# Patient Record
Sex: FEMALE | Race: BLACK OR AFRICAN AMERICAN | NOT HISPANIC OR LATINO | Employment: OTHER | URBAN - METROPOLITAN AREA
[De-identification: names, ages, dates, MRNs, and addresses within clinical notes are randomized per-mention and may not be internally consistent; named-entity substitution may affect disease eponyms.]

---

## 2023-02-23 PROBLEM — H05.89: Status: ACTIVE | Noted: 2023-02-23

## 2023-02-23 PROBLEM — B96.89 BACTERIAL VAGINITIS: Status: ACTIVE | Noted: 2023-02-23

## 2023-02-23 PROBLEM — M79.89 BILATERAL SWELLING OF FEET: Status: ACTIVE | Noted: 2023-02-23

## 2023-02-23 PROBLEM — E55.9 VITAMIN D DEFICIENCY: Status: ACTIVE | Noted: 2023-02-23

## 2023-02-23 PROBLEM — N39.0 UTI (URINARY TRACT INFECTION): Status: ACTIVE | Noted: 2023-02-23

## 2023-02-23 PROBLEM — M25.473 ANKLE EDEMA: Status: ACTIVE | Noted: 2023-02-23

## 2023-02-23 PROBLEM — R21 RASH OF FACE: Status: ACTIVE | Noted: 2023-02-23

## 2023-02-23 PROBLEM — B02.23 SHINGLES (HERPES ZOSTER) POLYNEUROPATHY: Status: ACTIVE | Noted: 2023-02-23

## 2023-02-23 PROBLEM — R53.83 FATIGUE: Status: ACTIVE | Noted: 2023-02-23

## 2023-02-23 PROBLEM — R05.9 COUGH: Status: ACTIVE | Noted: 2023-02-23

## 2023-02-23 PROBLEM — N39.0 RECURRENT URINARY TRACT INFECTION: Status: ACTIVE | Noted: 2023-02-23

## 2023-02-23 PROBLEM — N93.9 VAGINAL BLEEDING: Status: ACTIVE | Noted: 2023-02-23

## 2023-02-23 PROBLEM — J18.9 PNEUMONIA: Status: ACTIVE | Noted: 2023-02-23

## 2023-02-23 PROBLEM — R30.0 BURNING WITH URINATION: Status: ACTIVE | Noted: 2023-02-23

## 2023-02-23 PROBLEM — R10.9 ABDOMINAL PAIN: Status: ACTIVE | Noted: 2023-02-23

## 2023-02-23 PROBLEM — R09.89 CHEST CONGESTION: Status: ACTIVE | Noted: 2023-02-23

## 2023-02-23 PROBLEM — R92.8 ABNORMAL MAMMOGRAM: Status: ACTIVE | Noted: 2023-02-23

## 2023-02-23 PROBLEM — Z86.0100 HISTORY OF COLON POLYPS: Status: ACTIVE | Noted: 2023-02-23

## 2023-02-23 PROBLEM — I10 HYPERTENSION: Status: ACTIVE | Noted: 2023-02-23

## 2023-02-23 PROBLEM — R09.81 HEAD CONGESTION: Status: ACTIVE | Noted: 2023-02-23

## 2023-02-23 PROBLEM — R07.9 CHEST PAIN: Status: ACTIVE | Noted: 2023-02-23

## 2023-02-23 PROBLEM — R10.9 FLANK PAIN: Status: ACTIVE | Noted: 2023-02-23

## 2023-02-23 PROBLEM — J45.909 ACUTE ASTHMATIC BRONCHITIS (HHS-HCC): Status: ACTIVE | Noted: 2023-02-23

## 2023-02-23 PROBLEM — R30.0 DYSURIA: Status: ACTIVE | Noted: 2023-02-23

## 2023-02-23 PROBLEM — E78.5 HYPERLIPIDEMIA: Status: ACTIVE | Noted: 2023-02-23

## 2023-02-23 PROBLEM — R31.29 HEMATURIA, MICROSCOPIC: Status: ACTIVE | Noted: 2023-02-23

## 2023-02-23 PROBLEM — R00.2 PALPITATIONS: Status: ACTIVE | Noted: 2023-02-23

## 2023-02-23 PROBLEM — B37.0 ORAL CANDIDIASIS: Status: ACTIVE | Noted: 2023-02-23

## 2023-02-23 PROBLEM — R39.9 URINARY SYMPTOM OR SIGN: Status: ACTIVE | Noted: 2023-02-23

## 2023-02-23 PROBLEM — N76.0 BACTERIAL VAGINITIS: Status: ACTIVE | Noted: 2023-02-23

## 2023-02-23 PROBLEM — L98.9 CHANGING SKIN LESION: Status: ACTIVE | Noted: 2023-02-23

## 2023-02-23 PROBLEM — R73.9 HYPERGLYCEMIA: Status: ACTIVE | Noted: 2023-02-23

## 2023-02-23 PROBLEM — R21 RASH: Status: ACTIVE | Noted: 2023-02-23

## 2023-02-23 PROBLEM — Z86.010 HISTORY OF COLON POLYPS: Status: ACTIVE | Noted: 2023-02-23

## 2023-02-23 RX ORDER — MULTIVIT-MIN/IRON/FOLIC ACID/K 18-600-40
1 CAPSULE ORAL DAILY
COMMUNITY
Start: 2015-08-11

## 2023-02-23 RX ORDER — LATANOPROST 50 UG/ML
1 SOLUTION/ DROPS OPHTHALMIC NIGHTLY
COMMUNITY
Start: 2021-07-23

## 2023-02-23 RX ORDER — AMLODIPINE BESYLATE 5 MG/1
1 TABLET ORAL DAILY
COMMUNITY
Start: 2014-12-15 | End: 2023-03-20 | Stop reason: SDUPTHER

## 2023-02-23 RX ORDER — EZETIMIBE 10 MG/1
1 TABLET ORAL DAILY
COMMUNITY
Start: 2019-07-10 | End: 2023-03-20 | Stop reason: SDUPTHER

## 2023-02-23 RX ORDER — METOPROLOL TARTRATE 50 MG/1
1 TABLET ORAL 2 TIMES DAILY
COMMUNITY
Start: 2014-12-15 | End: 2023-03-20 | Stop reason: SDUPTHER

## 2023-02-23 RX ORDER — ATORVASTATIN CALCIUM 40 MG/1
1 TABLET, FILM COATED ORAL NIGHTLY
COMMUNITY
Start: 2014-02-17 | End: 2023-03-20 | Stop reason: SDUPTHER

## 2023-02-23 RX ORDER — ASPIRIN 81 MG/1
1 TABLET ORAL DAILY
COMMUNITY
Start: 2014-03-11

## 2023-02-23 RX ORDER — VALSARTAN 160 MG/1
1 TABLET ORAL 2 TIMES DAILY
COMMUNITY
Start: 2015-05-14 | End: 2023-03-20 | Stop reason: SDUPTHER

## 2023-02-23 RX ORDER — ALBUTEROL SULFATE 90 UG/1
1-2 AEROSOL, METERED RESPIRATORY (INHALATION) EVERY 6 HOURS PRN
COMMUNITY

## 2023-02-23 RX ORDER — ASPIRIN 325 MG
1 TABLET, DELAYED RELEASE (ENTERIC COATED) ORAL DAILY
COMMUNITY
Start: 2021-01-20 | End: 2024-04-03 | Stop reason: ALTCHOICE

## 2023-03-15 LAB
ALANINE AMINOTRANSFERASE (SGPT) (U/L) IN SER/PLAS: 23 U/L (ref 7–45)
ALBUMIN (G/DL) IN SER/PLAS: 4 G/DL (ref 3.4–5)
ALKALINE PHOSPHATASE (U/L) IN SER/PLAS: 43 U/L (ref 33–136)
ANION GAP IN SER/PLAS: 14 MMOL/L (ref 10–20)
ASPARTATE AMINOTRANSFERASE (SGOT) (U/L) IN SER/PLAS: 20 U/L (ref 9–39)
BASOPHILS (10*3/UL) IN BLOOD BY AUTOMATED COUNT: 0.09 X10E9/L (ref 0–0.1)
BASOPHILS/100 LEUKOCYTES IN BLOOD BY AUTOMATED COUNT: 1 % (ref 0–2)
BILIRUBIN TOTAL (MG/DL) IN SER/PLAS: 1.4 MG/DL (ref 0–1.2)
CALCIUM (MG/DL) IN SER/PLAS: 9.8 MG/DL (ref 8.6–10.6)
CARBON DIOXIDE, TOTAL (MMOL/L) IN SER/PLAS: 30 MMOL/L (ref 21–32)
CHLORIDE (MMOL/L) IN SER/PLAS: 103 MMOL/L (ref 98–107)
CHOLESTEROL (MG/DL) IN SER/PLAS: 151 MG/DL (ref 0–199)
CHOLESTEROL IN HDL (MG/DL) IN SER/PLAS: 52.6 MG/DL
CHOLESTEROL/HDL RATIO: 2.9
CREATININE (MG/DL) IN SER/PLAS: 0.62 MG/DL (ref 0.5–1.05)
EOSINOPHILS (10*3/UL) IN BLOOD BY AUTOMATED COUNT: 0.23 X10E9/L (ref 0–0.4)
EOSINOPHILS/100 LEUKOCYTES IN BLOOD BY AUTOMATED COUNT: 2.6 % (ref 0–6)
ERYTHROCYTE DISTRIBUTION WIDTH (RATIO) BY AUTOMATED COUNT: 15 % (ref 11.5–14.5)
ERYTHROCYTE MEAN CORPUSCULAR HEMOGLOBIN CONCENTRATION (G/DL) BY AUTOMATED: 31.2 G/DL (ref 32–36)
ERYTHROCYTE MEAN CORPUSCULAR VOLUME (FL) BY AUTOMATED COUNT: 85 FL (ref 80–100)
ERYTHROCYTES (10*6/UL) IN BLOOD BY AUTOMATED COUNT: 5.06 X10E12/L (ref 4–5.2)
ESTIMATED AVERAGE GLUCOSE FOR HBA1C: 123 MG/DL
GFR FEMALE: >90 ML/MIN/1.73M2
GLUCOSE (MG/DL) IN SER/PLAS: 93 MG/DL (ref 74–99)
HEMATOCRIT (%) IN BLOOD BY AUTOMATED COUNT: 42.9 % (ref 36–46)
HEMOGLOBIN (G/DL) IN BLOOD: 13.4 G/DL (ref 12–16)
HEMOGLOBIN A1C/HEMOGLOBIN TOTAL IN BLOOD: 5.9 %
IMMATURE GRANULOCYTES/100 LEUKOCYTES IN BLOOD BY AUTOMATED COUNT: 0.2 % (ref 0–0.9)
LDL: 80 MG/DL (ref 0–99)
LEUKOCYTES (10*3/UL) IN BLOOD BY AUTOMATED COUNT: 8.7 X10E9/L (ref 4.4–11.3)
LYMPHOCYTES (10*3/UL) IN BLOOD BY AUTOMATED COUNT: 3.24 X10E9/L (ref 0.8–3)
LYMPHOCYTES/100 LEUKOCYTES IN BLOOD BY AUTOMATED COUNT: 37.2 % (ref 13–44)
MONOCYTES (10*3/UL) IN BLOOD BY AUTOMATED COUNT: 0.69 X10E9/L (ref 0.05–0.8)
MONOCYTES/100 LEUKOCYTES IN BLOOD BY AUTOMATED COUNT: 7.9 % (ref 2–10)
NEUTROPHILS (10*3/UL) IN BLOOD BY AUTOMATED COUNT: 4.44 X10E9/L (ref 1.6–5.5)
NEUTROPHILS/100 LEUKOCYTES IN BLOOD BY AUTOMATED COUNT: 51.1 % (ref 40–80)
NRBC (PER 100 WBCS) BY AUTOMATED COUNT: 0 /100 WBC (ref 0–0)
PLATELETS (10*3/UL) IN BLOOD AUTOMATED COUNT: 394 X10E9/L (ref 150–450)
POTASSIUM (MMOL/L) IN SER/PLAS: 3.6 MMOL/L (ref 3.5–5.3)
PROTEIN TOTAL: 6.6 G/DL (ref 6.4–8.2)
SODIUM (MMOL/L) IN SER/PLAS: 143 MMOL/L (ref 136–145)
THYROTROPIN (MIU/L) IN SER/PLAS BY DETECTION LIMIT <= 0.05 MIU/L: 1.3 MIU/L (ref 0.44–3.98)
TRIGLYCERIDE (MG/DL) IN SER/PLAS: 93 MG/DL (ref 0–149)
UREA NITROGEN (MG/DL) IN SER/PLAS: 9 MG/DL (ref 6–23)
VLDL: 19 MG/DL (ref 0–40)

## 2023-03-19 NOTE — PROGRESS NOTES
Subjective   Patient ID: Alexandra Love is a 77 y.o. female who presents for Follow-up (1 mo fuv).    HPI   She continues on Ezetimibe, Valsartan, Amlodipine, Metoprolol, and Atorvastatin, as they are controlling her symptoms well. She does not voice any side effects. Bp good range.  No cp or sob, she is feeling well    She had cxr repeated from pneumonia.  Infiltrate has cleared.  She is no longer coughing.  She states last week she occasionally brought up some white sputum    She notes an abnormality on the inside of the left thigh. It has been there for several years. She states that it has started to irritate her, it rubs when she walks and gets sore.  She would like to see derm for this and have removed.     Review of Systems   Constitutional: Negative.    Respiratory: Negative.     Cardiovascular: Negative.    Psychiatric/Behavioral: Negative.         Objective   /77   Pulse 61   Temp 36.1 °C (97 °F)   Wt 79.8 kg (176 lb)   BMI 32.19 kg/m²     Physical Exam  Constitutional:       Appearance: Normal appearance.   Cardiovascular:      Rate and Rhythm: Normal rate and regular rhythm.      Pulses: Normal pulses.      Heart sounds: Normal heart sounds.   Pulmonary:      Effort: Pulmonary effort is normal.      Breath sounds: Normal breath sounds.   Abdominal:      General: Bowel sounds are normal.      Palpations: Abdomen is soft.   Musculoskeletal:         General: No tenderness. Normal range of motion.   Skin:     General: Skin is warm and dry.   Neurological:      Mental Status: She is alert and oriented to person, place, and time.   Psychiatric:         Mood and Affect: Mood normal.         Thought Content: Thought content normal.         Judgment: Judgment normal.         Assessment/Plan   Reviewed Labs from 03/15/2023, which  revealed blood count WNL.  Chemistries in good range with FBS of 93. Total Cholesterol 151, HDL 52.6, LDL 80, Triglycerides 93  A1C 5.9  TSH 1.3    Problem List Items Addressed  This Visit    Reviewed Chest X Ray from 03/15/2023 which showed she is back to normal and clear.     Skin Abnormality on the inside of the left thigh.   Provided referral for Dermatology.     Hyperlipidemia  Continue on Atorvastatin 40 mg and Ezetimibe 10 mg. Prescription sent to pharmacy.       Circulatory    Hypertension - Primary  Continue on Amlodipine 5 mg, Metoprolol 50 mg, and Valsartan 160 mg daily. Prescription sent to pharmacy.      Problem List Items Addressed This Visit          Circulatory    Hypertension - Primary    Relevant Orders    Referral to Dermatology       Musculoskeletal    Mass of left thigh    Relevant Medications    amLODIPine (Norvasc) 5 mg tablet    metoprolol tartrate (Lopressor) 50 mg tablet    Other Relevant Orders    Referral to Dermatology       Other    Hyperlipidemia    Relevant Medications    atorvastatin (Lipitor) 40 mg tablet    ezetimibe (Zetia) 10 mg tablet    valsartan (Diovan) 160 mg tablet     Follow up in 6 months, unless a visit is needed prior.      Scribe Attestation  By signing my name below, Lakeshia DUFFY Scribe   attest that this documentation has been prepared under the direction and in the presence of Pratima Albert DO.

## 2023-03-20 ENCOUNTER — OFFICE VISIT (OUTPATIENT)
Dept: PRIMARY CARE | Facility: CLINIC | Age: 78
End: 2023-03-20
Payer: MEDICARE

## 2023-03-20 VITALS
TEMPERATURE: 97 F | WEIGHT: 176 LBS | HEART RATE: 61 BPM | DIASTOLIC BLOOD PRESSURE: 77 MMHG | SYSTOLIC BLOOD PRESSURE: 124 MMHG | BODY MASS INDEX: 32.19 KG/M2

## 2023-03-20 DIAGNOSIS — R22.42 MASS OF LEFT THIGH: ICD-10-CM

## 2023-03-20 DIAGNOSIS — I10 PRIMARY HYPERTENSION: Primary | ICD-10-CM

## 2023-03-20 DIAGNOSIS — E78.2 MIXED HYPERLIPIDEMIA: ICD-10-CM

## 2023-03-20 PROCEDURE — 3078F DIAST BP <80 MM HG: CPT | Performed by: FAMILY MEDICINE

## 2023-03-20 PROCEDURE — 3074F SYST BP LT 130 MM HG: CPT | Performed by: FAMILY MEDICINE

## 2023-03-20 PROCEDURE — 99214 OFFICE O/P EST MOD 30 MIN: CPT | Performed by: FAMILY MEDICINE

## 2023-03-20 PROCEDURE — 1036F TOBACCO NON-USER: CPT | Performed by: FAMILY MEDICINE

## 2023-03-20 RX ORDER — METOPROLOL TARTRATE 50 MG/1
50 TABLET ORAL 2 TIMES DAILY
Qty: 180 TABLET | Refills: 1 | Status: SHIPPED | OUTPATIENT
Start: 2023-03-20 | End: 2023-06-14 | Stop reason: SDUPTHER

## 2023-03-20 RX ORDER — ATORVASTATIN CALCIUM 40 MG/1
40 TABLET, FILM COATED ORAL NIGHTLY
Qty: 90 TABLET | Refills: 1 | Status: SHIPPED | OUTPATIENT
Start: 2023-03-20 | End: 2023-11-29 | Stop reason: SDUPTHER

## 2023-03-20 RX ORDER — AMLODIPINE BESYLATE 5 MG/1
5 TABLET ORAL DAILY
Qty: 90 TABLET | Refills: 1 | Status: SHIPPED | OUTPATIENT
Start: 2023-03-20 | End: 2023-06-14 | Stop reason: SDUPTHER

## 2023-03-20 RX ORDER — EZETIMIBE 10 MG/1
10 TABLET ORAL DAILY
Qty: 90 TABLET | Refills: 1 | Status: SHIPPED | OUTPATIENT
Start: 2023-03-20 | End: 2023-09-29 | Stop reason: SDUPTHER

## 2023-03-20 RX ORDER — VALSARTAN 160 MG/1
160 TABLET ORAL 2 TIMES DAILY
Qty: 180 TABLET | Refills: 1 | Status: SHIPPED | OUTPATIENT
Start: 2023-03-20 | End: 2023-06-14 | Stop reason: SDUPTHER

## 2023-03-20 ASSESSMENT — ENCOUNTER SYMPTOMS
PSYCHIATRIC NEGATIVE: 1
CARDIOVASCULAR NEGATIVE: 1
RESPIRATORY NEGATIVE: 1
CONSTITUTIONAL NEGATIVE: 1

## 2023-03-20 ASSESSMENT — PATIENT HEALTH QUESTIONNAIRE - PHQ9
2. FEELING DOWN, DEPRESSED OR HOPELESS: NOT AT ALL
1. LITTLE INTEREST OR PLEASURE IN DOING THINGS: NOT AT ALL
SUM OF ALL RESPONSES TO PHQ9 QUESTIONS 1 AND 2: 0

## 2023-03-20 NOTE — PATIENT INSTRUCTIONS
Skin Abnormality on the inside of the thigh.   Provided referral for Dermatology.     Hyperlipidemia  Continue on Atorvastatin 40 mg and Ezetimibe 10 mg. Prescription sent to pharmacy.       Circulatory    Hypertension - Primary  Continue on Amlodipine 5 mg, Metoprolol 50 mg, and Valsartan 160 mg daily. Prescription sent to pharmacy.

## 2023-06-14 DIAGNOSIS — E78.2 MIXED HYPERLIPIDEMIA: ICD-10-CM

## 2023-06-14 DIAGNOSIS — R22.42 MASS OF LEFT THIGH: ICD-10-CM

## 2023-06-14 RX ORDER — VALSARTAN 160 MG/1
160 TABLET ORAL 2 TIMES DAILY
Qty: 180 TABLET | Refills: 1 | Status: SHIPPED | OUTPATIENT
Start: 2023-06-14 | End: 2024-01-22 | Stop reason: SDUPTHER

## 2023-06-14 RX ORDER — AMLODIPINE BESYLATE 5 MG/1
5 TABLET ORAL DAILY
Qty: 90 TABLET | Refills: 1 | Status: SHIPPED | OUTPATIENT
Start: 2023-06-14 | End: 2023-11-29 | Stop reason: SDUPTHER

## 2023-06-14 RX ORDER — METOPROLOL TARTRATE 50 MG/1
50 TABLET ORAL 2 TIMES DAILY
Qty: 180 TABLET | Refills: 1 | Status: SHIPPED | OUTPATIENT
Start: 2023-06-14 | End: 2024-01-22 | Stop reason: SDUPTHER

## 2023-07-07 ENCOUNTER — TELEPHONE (OUTPATIENT)
Dept: PRIMARY CARE | Facility: CLINIC | Age: 78
End: 2023-07-07
Payer: MEDICARE

## 2023-07-07 NOTE — TELEPHONE ENCOUNTER
Pt st both of her feet are very swollen and they hurt. Is there anything you can recommend she do?

## 2023-09-05 NOTE — PROGRESS NOTES
Subjective   Patient ID: Alexandra Love is a 77 y.o. female who presents for Med Management.    The patient is not compliant with medications. Patient denies any side effects to the medications.  Has not started spironolactone started by Dr Cantu    HPI pt was hydrochlorothiazide, discontinued and started spironolactone 25 mg twice a day.  She has not started but stopped hydrochlor   ankle swelling during the day but not on any diuretic.  Goes down over night.  Had discussed amlodipine may cause also, she is still taking.  Would like her to try spironolactone as prescribed by cardiology and stay on ARB and amlodipine as well.  She is agreeable    Bp is mildly elevated today, she has taken her meds except for spironolactone    She is tolerating statin, taking zetia as well    She is due for labs.  She is fasting today and will have them drawn this morning    Review of Systems      Patient Care Team:  Pratima Albert DO as PCP - General  Pratima Albert DO as PCP - [facility] Attributed Provider  Pratima Albert DO       Objective   /88   Temp 36.4 °C (97.5 °F)   Wt 80.7 kg (178 lb)   SpO2 93%   BMI 32.56 kg/m²     Physical Exam  Constitutional:       Appearance: Normal appearance.   Neck:      Vascular: No carotid bruit.   Cardiovascular:      Rate and Rhythm: Normal rate and regular rhythm.      Pulses: Normal pulses.      Heart sounds: Normal heart sounds.   Pulmonary:      Effort: Pulmonary effort is normal.      Breath sounds: Normal breath sounds.   Musculoskeletal:         General: Normal range of motion.      Cervical back: Normal range of motion.      Right lower leg: No edema.      Left lower leg: No edema.   Lymphadenopathy:      Cervical: No cervical adenopathy.   Skin:     General: Skin is warm and dry.   Neurological:      Mental Status: She is alert and oriented to person, place, and time.   Psychiatric:         Mood and Affect: Mood normal.         Thought  Content: Thought content normal.         Judgment: Judgment normal.             Assessment/Plan   Problem List Items Addressed This Visit       Ankle edema - Primary    Relevant Orders    TSH with reflex to Free T4 if abnormal    Hyperglycemia    Relevant Orders    Comprehensive Metabolic Panel    Hemoglobin A1C    TSH with reflex to Free T4 if abnormal    Hyperlipidemia    Relevant Orders    Comprehensive Metabolic Panel    Lipid Panel    Hypertension    Relevant Orders    CBC and Auto Differential    TSH with reflex to Free T4 if abnormal    Vitamin D deficiency     Other Visit Diagnoses       Breast cancer screening by mammogram        Relevant Orders    BI mammo bilateral screening tomosynthesis        Fasting labs, pt will follow up in 2 weeks to review and will recheck her bp at that time    She will start spironolactone as prescribed by cardiology and stay off hydrochlorothiazide .  Will take all other meds as prescribed    Ordered mammogram, pt plans on doing it this fall, she is doing q o year    She will call sooner if any problem          Scribe Attestation  By signing my name below, [employer]imple , Scribe   attest that this documentation has been prepared under the direction and in the presence of Pratima Albert DO.

## 2023-09-07 ENCOUNTER — OFFICE VISIT (OUTPATIENT)
Dept: PRIMARY CARE | Facility: CLINIC | Age: 78
End: 2023-09-07
Payer: MEDICARE

## 2023-09-07 ENCOUNTER — LAB (OUTPATIENT)
Dept: LAB | Facility: LAB | Age: 78
End: 2023-09-07
Payer: MEDICARE

## 2023-09-07 VITALS
TEMPERATURE: 97.5 F | SYSTOLIC BLOOD PRESSURE: 150 MMHG | DIASTOLIC BLOOD PRESSURE: 88 MMHG | BODY MASS INDEX: 32.56 KG/M2 | WEIGHT: 178 LBS | OXYGEN SATURATION: 93 %

## 2023-09-07 DIAGNOSIS — I10 PRIMARY HYPERTENSION: ICD-10-CM

## 2023-09-07 DIAGNOSIS — M25.473 ANKLE EDEMA: Primary | ICD-10-CM

## 2023-09-07 DIAGNOSIS — E55.9 VITAMIN D DEFICIENCY: ICD-10-CM

## 2023-09-07 DIAGNOSIS — R73.9 HYPERGLYCEMIA: ICD-10-CM

## 2023-09-07 DIAGNOSIS — M25.473 ANKLE EDEMA: ICD-10-CM

## 2023-09-07 DIAGNOSIS — E78.5 HYPERLIPIDEMIA, UNSPECIFIED HYPERLIPIDEMIA TYPE: ICD-10-CM

## 2023-09-07 DIAGNOSIS — Z12.31 BREAST CANCER SCREENING BY MAMMOGRAM: ICD-10-CM

## 2023-09-07 PROBLEM — D48.5 NEOPLASM OF UNCERTAIN BEHAVIOR OF SKIN: Status: ACTIVE | Noted: 2023-04-19

## 2023-09-07 PROBLEM — L82.1 OTHER SEBORRHEIC KERATOSIS: Status: ACTIVE | Noted: 2023-04-19

## 2023-09-07 PROBLEM — J45.909 ACUTE ASTHMATIC BRONCHITIS (HHS-HCC): Status: ACTIVE | Noted: 2023-09-07

## 2023-09-07 PROBLEM — R60.9 EDEMA: Status: ACTIVE | Noted: 2023-09-07

## 2023-09-07 LAB
ALANINE AMINOTRANSFERASE (SGPT) (U/L) IN SER/PLAS: 18 U/L (ref 7–45)
ALBUMIN (G/DL) IN SER/PLAS: 4.5 G/DL (ref 3.4–5)
ALKALINE PHOSPHATASE (U/L) IN SER/PLAS: 50 U/L (ref 33–136)
ANION GAP IN SER/PLAS: 12 MMOL/L (ref 10–20)
ASPARTATE AMINOTRANSFERASE (SGOT) (U/L) IN SER/PLAS: 18 U/L (ref 9–39)
BASOPHILS (10*3/UL) IN BLOOD BY AUTOMATED COUNT: 0.05 X10E9/L (ref 0–0.1)
BASOPHILS/100 LEUKOCYTES IN BLOOD BY AUTOMATED COUNT: 0.7 % (ref 0–2)
BILIRUBIN TOTAL (MG/DL) IN SER/PLAS: 1.9 MG/DL (ref 0–1.2)
CALCIUM (MG/DL) IN SER/PLAS: 10 MG/DL (ref 8.6–10.6)
CARBON DIOXIDE, TOTAL (MMOL/L) IN SER/PLAS: 30 MMOL/L (ref 21–32)
CHLORIDE (MMOL/L) IN SER/PLAS: 105 MMOL/L (ref 98–107)
CHOLESTEROL (MG/DL) IN SER/PLAS: 142 MG/DL (ref 0–199)
CHOLESTEROL IN HDL (MG/DL) IN SER/PLAS: 55.9 MG/DL
CHOLESTEROL/HDL RATIO: 2.5
CREATININE (MG/DL) IN SER/PLAS: 0.7 MG/DL (ref 0.5–1.05)
EOSINOPHILS (10*3/UL) IN BLOOD BY AUTOMATED COUNT: 0.3 X10E9/L (ref 0–0.4)
EOSINOPHILS/100 LEUKOCYTES IN BLOOD BY AUTOMATED COUNT: 4.1 % (ref 0–6)
ERYTHROCYTE DISTRIBUTION WIDTH (RATIO) BY AUTOMATED COUNT: 14.8 % (ref 11.5–14.5)
ERYTHROCYTE MEAN CORPUSCULAR HEMOGLOBIN CONCENTRATION (G/DL) BY AUTOMATED: 30.7 G/DL (ref 32–36)
ERYTHROCYTE MEAN CORPUSCULAR VOLUME (FL) BY AUTOMATED COUNT: 85 FL (ref 80–100)
ERYTHROCYTES (10*6/UL) IN BLOOD BY AUTOMATED COUNT: 5.29 X10E12/L (ref 4–5.2)
ESTIMATED AVERAGE GLUCOSE FOR HBA1C: 120 MG/DL
GFR FEMALE: 90 ML/MIN/1.73M2
GLUCOSE (MG/DL) IN SER/PLAS: 89 MG/DL (ref 74–99)
HEMATOCRIT (%) IN BLOOD BY AUTOMATED COUNT: 44.9 % (ref 36–46)
HEMOGLOBIN (G/DL) IN BLOOD: 13.8 G/DL (ref 12–16)
HEMOGLOBIN A1C/HEMOGLOBIN TOTAL IN BLOOD: 5.8 %
IMMATURE GRANULOCYTES/100 LEUKOCYTES IN BLOOD BY AUTOMATED COUNT: 0.3 % (ref 0–0.9)
LDL: 70 MG/DL (ref 0–99)
LEUKOCYTES (10*3/UL) IN BLOOD BY AUTOMATED COUNT: 7.4 X10E9/L (ref 4.4–11.3)
LYMPHOCYTES (10*3/UL) IN BLOOD BY AUTOMATED COUNT: 2.52 X10E9/L (ref 0.8–3)
LYMPHOCYTES/100 LEUKOCYTES IN BLOOD BY AUTOMATED COUNT: 34.1 % (ref 13–44)
MONOCYTES (10*3/UL) IN BLOOD BY AUTOMATED COUNT: 0.62 X10E9/L (ref 0.05–0.8)
MONOCYTES/100 LEUKOCYTES IN BLOOD BY AUTOMATED COUNT: 8.4 % (ref 2–10)
NEUTROPHILS (10*3/UL) IN BLOOD BY AUTOMATED COUNT: 3.87 X10E9/L (ref 1.6–5.5)
NEUTROPHILS/100 LEUKOCYTES IN BLOOD BY AUTOMATED COUNT: 52.4 % (ref 40–80)
NRBC (PER 100 WBCS) BY AUTOMATED COUNT: 0 /100 WBC (ref 0–0)
PLATELETS (10*3/UL) IN BLOOD AUTOMATED COUNT: 317 X10E9/L (ref 150–450)
POTASSIUM (MMOL/L) IN SER/PLAS: 4 MMOL/L (ref 3.5–5.3)
PROTEIN TOTAL: 7.8 G/DL (ref 6.4–8.2)
SODIUM (MMOL/L) IN SER/PLAS: 143 MMOL/L (ref 136–145)
THYROTROPIN (MIU/L) IN SER/PLAS BY DETECTION LIMIT <= 0.05 MIU/L: 1.27 MIU/L (ref 0.44–3.98)
TRIGLYCERIDE (MG/DL) IN SER/PLAS: 81 MG/DL (ref 0–149)
UREA NITROGEN (MG/DL) IN SER/PLAS: 13 MG/DL (ref 6–23)
VLDL: 16 MG/DL (ref 0–40)

## 2023-09-07 PROCEDURE — 36415 COLL VENOUS BLD VENIPUNCTURE: CPT

## 2023-09-07 PROCEDURE — 99214 OFFICE O/P EST MOD 30 MIN: CPT | Performed by: FAMILY MEDICINE

## 2023-09-07 PROCEDURE — 80053 COMPREHEN METABOLIC PANEL: CPT

## 2023-09-07 PROCEDURE — 1159F MED LIST DOCD IN RCRD: CPT | Performed by: FAMILY MEDICINE

## 2023-09-07 PROCEDURE — 80061 LIPID PANEL: CPT

## 2023-09-07 PROCEDURE — 1126F AMNT PAIN NOTED NONE PRSNT: CPT | Performed by: FAMILY MEDICINE

## 2023-09-07 PROCEDURE — 83036 HEMOGLOBIN GLYCOSYLATED A1C: CPT

## 2023-09-07 PROCEDURE — 3077F SYST BP >= 140 MM HG: CPT | Performed by: FAMILY MEDICINE

## 2023-09-07 PROCEDURE — 84443 ASSAY THYROID STIM HORMONE: CPT

## 2023-09-07 PROCEDURE — 85025 COMPLETE CBC W/AUTO DIFF WBC: CPT

## 2023-09-07 PROCEDURE — 1036F TOBACCO NON-USER: CPT | Performed by: FAMILY MEDICINE

## 2023-09-07 PROCEDURE — 3079F DIAST BP 80-89 MM HG: CPT | Performed by: FAMILY MEDICINE

## 2023-09-07 RX ORDER — SPIRONOLACTONE 25 MG/1
1 TABLET ORAL 2 TIMES DAILY
COMMUNITY
Start: 2023-08-29 | End: 2023-09-29 | Stop reason: ALTCHOICE

## 2023-09-07 RX ORDER — HYDROCHLOROTHIAZIDE 25 MG/1
1 TABLET ORAL DAILY
COMMUNITY
Start: 2023-07-19 | End: 2023-09-07 | Stop reason: ALTCHOICE

## 2023-09-07 ASSESSMENT — ENCOUNTER SYMPTOMS
DEPRESSION: 0
OCCASIONAL FEELINGS OF UNSTEADINESS: 0
LOSS OF SENSATION IN FEET: 0

## 2023-09-25 ENCOUNTER — APPOINTMENT (OUTPATIENT)
Dept: PRIMARY CARE | Facility: CLINIC | Age: 78
End: 2023-09-25
Payer: MEDICARE

## 2023-09-29 ENCOUNTER — OFFICE VISIT (OUTPATIENT)
Dept: PRIMARY CARE | Facility: CLINIC | Age: 78
End: 2023-09-29
Payer: MEDICARE

## 2023-09-29 VITALS
BODY MASS INDEX: 32.56 KG/M2 | SYSTOLIC BLOOD PRESSURE: 136 MMHG | OXYGEN SATURATION: 97 % | WEIGHT: 178 LBS | DIASTOLIC BLOOD PRESSURE: 86 MMHG | TEMPERATURE: 97.5 F

## 2023-09-29 DIAGNOSIS — Z12.11 SCREENING FOR COLON CANCER: ICD-10-CM

## 2023-09-29 DIAGNOSIS — Z00.00 MEDICARE ANNUAL WELLNESS VISIT, SUBSEQUENT: Primary | ICD-10-CM

## 2023-09-29 DIAGNOSIS — M25.473 ANKLE EDEMA: ICD-10-CM

## 2023-09-29 DIAGNOSIS — I10 PRIMARY HYPERTENSION: ICD-10-CM

## 2023-09-29 DIAGNOSIS — Z23 NEED FOR INFLUENZA VACCINATION: ICD-10-CM

## 2023-09-29 DIAGNOSIS — Z23 NEED FOR PNEUMOCOCCAL VACCINATION: ICD-10-CM

## 2023-09-29 DIAGNOSIS — R73.9 HYPERGLYCEMIA: ICD-10-CM

## 2023-09-29 DIAGNOSIS — E78.2 MIXED HYPERLIPIDEMIA: ICD-10-CM

## 2023-09-29 PROBLEM — Z86.69 H/O BLEPHARITIS: Status: ACTIVE | Noted: 2023-06-30

## 2023-09-29 PROBLEM — H35.89 RETINAL NERVE FIBER BUNDLE DEFECTS: Status: ACTIVE | Noted: 2023-06-30

## 2023-09-29 PROBLEM — H04.123 DRY EYE SYNDROME OF BOTH EYES: Status: ACTIVE | Noted: 2023-06-30

## 2023-09-29 PROBLEM — H40.1131 PRIMARY OPEN ANGLE GLAUCOMA (POAG) OF BOTH EYES, MILD STAGE: Status: ACTIVE | Noted: 2023-06-30

## 2023-09-29 PROBLEM — H25.813 COMBINED FORMS OF AGE-RELATED CATARACT OF BOTH EYES: Status: ACTIVE | Noted: 2023-06-30

## 2023-09-29 PROCEDURE — G0008 ADMIN INFLUENZA VIRUS VAC: HCPCS | Performed by: FAMILY MEDICINE

## 2023-09-29 PROCEDURE — 1036F TOBACCO NON-USER: CPT | Performed by: FAMILY MEDICINE

## 2023-09-29 PROCEDURE — 3079F DIAST BP 80-89 MM HG: CPT | Performed by: FAMILY MEDICINE

## 2023-09-29 PROCEDURE — 1159F MED LIST DOCD IN RCRD: CPT | Performed by: FAMILY MEDICINE

## 2023-09-29 PROCEDURE — 99214 OFFICE O/P EST MOD 30 MIN: CPT | Performed by: FAMILY MEDICINE

## 2023-09-29 PROCEDURE — G0009 ADMIN PNEUMOCOCCAL VACCINE: HCPCS | Performed by: FAMILY MEDICINE

## 2023-09-29 PROCEDURE — 1126F AMNT PAIN NOTED NONE PRSNT: CPT | Performed by: FAMILY MEDICINE

## 2023-09-29 PROCEDURE — 90677 PCV20 VACCINE IM: CPT | Performed by: FAMILY MEDICINE

## 2023-09-29 PROCEDURE — 1170F FXNL STATUS ASSESSED: CPT | Performed by: FAMILY MEDICINE

## 2023-09-29 PROCEDURE — 1160F RVW MEDS BY RX/DR IN RCRD: CPT | Performed by: FAMILY MEDICINE

## 2023-09-29 PROCEDURE — 3075F SYST BP GE 130 - 139MM HG: CPT | Performed by: FAMILY MEDICINE

## 2023-09-29 PROCEDURE — G0439 PPPS, SUBSEQ VISIT: HCPCS | Performed by: FAMILY MEDICINE

## 2023-09-29 PROCEDURE — 90662 IIV NO PRSV INCREASED AG IM: CPT | Performed by: FAMILY MEDICINE

## 2023-09-29 RX ORDER — HYDROCHLOROTHIAZIDE 25 MG/1
25 TABLET ORAL DAILY
Qty: 90 TABLET | Refills: 1
Start: 2023-09-29 | End: 2024-01-22 | Stop reason: SDUPTHER

## 2023-09-29 RX ORDER — EZETIMIBE 10 MG/1
10 TABLET ORAL DAILY
Qty: 90 TABLET | Refills: 1 | Status: SHIPPED | OUTPATIENT
Start: 2023-09-29 | End: 2024-01-22 | Stop reason: SDUPTHER

## 2023-09-29 ASSESSMENT — ACTIVITIES OF DAILY LIVING (ADL)
DRESSING: INDEPENDENT
BATHING: INDEPENDENT
JUDGMENT_ADEQUATE_SAFELY_COMPLETE_DAILY_ACTIVITIES: YES
PATIENT'S MEMORY ADEQUATE TO SAFELY COMPLETE DAILY ACTIVITIES?: YES
MANAGING_FINANCES: INDEPENDENT
TOILETING: INDEPENDENT
DRESSING YOURSELF: INDEPENDENT
DOING_HOUSEWORK: INDEPENDENT
GROOMING: INDEPENDENT
GROCERY_SHOPPING: INDEPENDENT
FEEDING YOURSELF: INDEPENDENT
ADEQUATE_TO_COMPLETE_ADL: YES
TAKING_MEDICATION: INDEPENDENT

## 2023-09-29 ASSESSMENT — LIFESTYLE VARIABLES
HOW MANY STANDARD DRINKS CONTAINING ALCOHOL DO YOU HAVE ON A TYPICAL DAY: PATIENT DOES NOT DRINK
HOW OFTEN DO YOU HAVE SIX OR MORE DRINKS ON ONE OCCASION: NEVER
SKIP TO QUESTIONS 9-10: 1
HOW OFTEN DO YOU HAVE A DRINK CONTAINING ALCOHOL: NEVER
AUDIT-C TOTAL SCORE: 0

## 2023-09-29 ASSESSMENT — PATIENT HEALTH QUESTIONNAIRE - PHQ9
1. LITTLE INTEREST OR PLEASURE IN DOING THINGS: NOT AT ALL
2. FEELING DOWN, DEPRESSED OR HOPELESS: NOT AT ALL
SUM OF ALL RESPONSES TO PHQ9 QUESTIONS 1 AND 2: 0

## 2023-09-29 ASSESSMENT — COLUMBIA-SUICIDE SEVERITY RATING SCALE - C-SSRS
2. HAVE YOU ACTUALLY HAD ANY THOUGHTS OF KILLING YOURSELF?: NO
6. HAVE YOU EVER DONE ANYTHING, STARTED TO DO ANYTHING, OR PREPARED TO DO ANYTHING TO END YOUR LIFE?: NO
1. IN THE PAST MONTH, HAVE YOU WISHED YOU WERE DEAD OR WISHED YOU COULD GO TO SLEEP AND NOT WAKE UP?: NO

## 2023-09-29 ASSESSMENT — ENCOUNTER SYMPTOMS
DEPRESSION: 0
OCCASIONAL FEELINGS OF UNSTEADINESS: 0
LOSS OF SENSATION IN FEET: 0

## 2023-09-29 NOTE — PROGRESS NOTES
Subjective   Reason for Visit: Alexandra Love is an 77 y.o. female here for a Medicare Wellness visit.     Past Medical, Surgical, and Family History reviewed and updated in chart.    Reviewed all medications by prescribing practitioner or clinical pharmacist (such as prescriptions, OTCs, herbal therapies and supplements) and documented in the medical record.    HPI  pt states bp better at home.  Dr Cantu took pt off spironolactone and back on hydrochlorothiazide 25 mg.  No longer having swelling of ankles and feels well, no sob    No cp or sob.  Feeling well.  Breathing better since changed back diuretic      Pt hgba1c mildly improved, controlling w diet and exercise.  Making good effort    Lipid profile controlled w zetia and lipitor.    Had eye exam 3/2023, scheduled for oct for follow up for glaucoma    Patient Care Team:  Pratima Albert DO as PCP - General  Pratima Albert DO as PCP - [facility] Attributed Provider  Pratima Albert DO     Review of Systems    Objective   Vitals:  /86   Temp 36.4 °C (97.5 °F)   Wt 80.7 kg (178 lb)   SpO2 97%   BMI 32.56 kg/m²       Physical Exam  Neck:      Vascular: No carotid bruit.   Cardiovascular:      Rate and Rhythm: Normal rate and regular rhythm.      Pulses: Normal pulses.      Heart sounds: Normal heart sounds.   Pulmonary:      Effort: Pulmonary effort is normal.      Breath sounds: Normal breath sounds.   Abdominal:      General: Bowel sounds are normal.      Palpations: Abdomen is soft. There is no mass.      Tenderness: There is no abdominal tenderness.   Musculoskeletal:         General: No tenderness. Normal range of motion.      Cervical back: Normal range of motion.      Right lower leg: No edema.      Left lower leg: No edema.   Skin:     General: Skin is warm and dry.   Neurological:      Mental Status: She is alert and oriented to person, place, and time.   Psychiatric:         Mood and Affect: Mood normal.          Thought Content: Thought content normal.         Judgment: Judgment normal.         Assessment/Plan   Problem List Items Addressed This Visit       Ankle edema    Hyperglycemia    Hyperlipidemia    Relevant Medications    ezetimibe (Zetia) 10 mg tablet    Hypertension    Relevant Medications    hydroCHLOROthiazide (HYDRODiuril) 25 mg tablet     Other Visit Diagnoses       Medicare annual wellness visit, subsequent    -  Primary    Need for influenza vaccination        Relevant Orders    Flu vaccine, quadrivalent, high-dose, preservative free, age 65y+ (FLUZONE)    Need for pneumococcal vaccination        Relevant Orders    Pneumococcal conjugate vaccine, 20-valent, adult (PREVNAR 20)    Screening for colon cancer        Relevant Orders    Colonoscopy Screening        Due for follow up colonoscopy, ordered today  Refilled meds, no changes  Flu and prevnar 20 vaccines today  Discussed covid and rsv vaccines today  Reviewed labs in detail  Has mamm order from me, told can not schedule until after Nov, although last mamm on chart is from 2021?? Pt will double check this w [facility] scheduling    Needs dexa, wants to wait    6 month follow up or prn sooner

## 2023-10-20 PROBLEM — H52.03 HYPEROPIA OF BOTH EYES: Status: ACTIVE | Noted: 2023-06-30

## 2023-11-03 DIAGNOSIS — D12.6 TUBULAR ADENOMA OF COLON: Primary | ICD-10-CM

## 2023-11-03 DIAGNOSIS — Z80.0 FAMILY HISTORY OF COLON CANCER: ICD-10-CM

## 2023-11-03 RX ORDER — POLYETHYLENE GLYCOL 3350, SODIUM CHLORIDE, SODIUM BICARBONATE, POTASSIUM CHLORIDE 420; 11.2; 5.72; 1.48 G/4L; G/4L; G/4L; G/4L
4000 POWDER, FOR SOLUTION ORAL ONCE
Qty: 4000 ML | Refills: 0 | Status: SHIPPED | OUTPATIENT
Start: 2023-11-03 | End: 2023-11-03

## 2023-11-29 DIAGNOSIS — E78.2 MIXED HYPERLIPIDEMIA: ICD-10-CM

## 2023-11-29 DIAGNOSIS — R22.42 MASS OF LEFT THIGH: ICD-10-CM

## 2023-11-29 NOTE — TELEPHONE ENCOUNTER
Pt states she needs refills on amlodipine 5mg and atorvastatin 40mg. Last ov 10/23/23. Next ov 01/05/2024. [facility]. Thank you

## 2023-11-30 RX ORDER — ATORVASTATIN CALCIUM 40 MG/1
40 TABLET, FILM COATED ORAL NIGHTLY
Qty: 30 TABLET | Refills: 1 | Status: SHIPPED | OUTPATIENT
Start: 2023-11-30 | End: 2024-01-05 | Stop reason: SDUPTHER

## 2023-11-30 RX ORDER — AMLODIPINE BESYLATE 5 MG/1
5 TABLET ORAL DAILY
Qty: 30 TABLET | Refills: 1 | Status: SHIPPED | OUTPATIENT
Start: 2023-11-30 | End: 2024-01-05 | Stop reason: SDUPTHER

## 2023-12-06 ENCOUNTER — ANCILLARY PROCEDURE (OUTPATIENT)
Dept: RADIOLOGY | Facility: CLINIC | Age: 78
End: 2023-12-06
Payer: MEDICARE

## 2023-12-06 DIAGNOSIS — Z12.31 BREAST CANCER SCREENING BY MAMMOGRAM: ICD-10-CM

## 2023-12-06 PROCEDURE — 77063 BREAST TOMOSYNTHESIS BI: CPT | Mod: BILATERAL PROCEDURE | Performed by: RADIOLOGY

## 2023-12-06 PROCEDURE — 77063 BREAST TOMOSYNTHESIS BI: CPT

## 2023-12-06 PROCEDURE — 77067 SCR MAMMO BI INCL CAD: CPT | Mod: BILATERAL PROCEDURE | Performed by: RADIOLOGY

## 2023-12-12 NOTE — TELEPHONE ENCOUNTER
----- Message from Pratima Albert DO sent at 12/11/2023  5:15 PM EST -----  Report to pt her mamm is wnl and repeat one year

## 2024-01-05 ENCOUNTER — APPOINTMENT (OUTPATIENT)
Dept: PRIMARY CARE | Facility: CLINIC | Age: 79
End: 2024-01-05
Payer: MEDICARE

## 2024-01-05 DIAGNOSIS — E78.2 MIXED HYPERLIPIDEMIA: ICD-10-CM

## 2024-01-05 DIAGNOSIS — R22.42 MASS OF LEFT THIGH: ICD-10-CM

## 2024-01-05 RX ORDER — ATORVASTATIN CALCIUM 40 MG/1
40 TABLET, FILM COATED ORAL NIGHTLY
Qty: 30 TABLET | Refills: 0 | Status: SHIPPED | OUTPATIENT
Start: 2024-01-05 | End: 2024-01-22 | Stop reason: SDUPTHER

## 2024-01-05 RX ORDER — AMLODIPINE BESYLATE 5 MG/1
5 TABLET ORAL DAILY
Qty: 30 TABLET | Refills: 0 | Status: SHIPPED | OUTPATIENT
Start: 2024-01-05 | End: 2024-01-22 | Stop reason: SDUPTHER

## 2024-01-22 ENCOUNTER — OFFICE VISIT (OUTPATIENT)
Dept: PRIMARY CARE | Facility: CLINIC | Age: 79
End: 2024-01-22
Payer: MEDICARE

## 2024-01-22 VITALS
BODY MASS INDEX: 32.19 KG/M2 | HEART RATE: 64 BPM | DIASTOLIC BLOOD PRESSURE: 82 MMHG | WEIGHT: 176 LBS | OXYGEN SATURATION: 94 % | SYSTOLIC BLOOD PRESSURE: 130 MMHG

## 2024-01-22 DIAGNOSIS — E78.2 MIXED HYPERLIPIDEMIA: ICD-10-CM

## 2024-01-22 DIAGNOSIS — R22.42 MASS OF LEFT THIGH: ICD-10-CM

## 2024-01-22 DIAGNOSIS — I10 PRIMARY HYPERTENSION: ICD-10-CM

## 2024-01-22 DIAGNOSIS — R73.9 HYPERGLYCEMIA: Primary | ICD-10-CM

## 2024-01-22 PROCEDURE — 1159F MED LIST DOCD IN RCRD: CPT | Performed by: FAMILY MEDICINE

## 2024-01-22 PROCEDURE — 3079F DIAST BP 80-89 MM HG: CPT | Performed by: FAMILY MEDICINE

## 2024-01-22 PROCEDURE — 1126F AMNT PAIN NOTED NONE PRSNT: CPT | Performed by: FAMILY MEDICINE

## 2024-01-22 PROCEDURE — 1160F RVW MEDS BY RX/DR IN RCRD: CPT | Performed by: FAMILY MEDICINE

## 2024-01-22 PROCEDURE — 1036F TOBACCO NON-USER: CPT | Performed by: FAMILY MEDICINE

## 2024-01-22 PROCEDURE — 3075F SYST BP GE 130 - 139MM HG: CPT | Performed by: FAMILY MEDICINE

## 2024-01-22 PROCEDURE — 99214 OFFICE O/P EST MOD 30 MIN: CPT | Performed by: FAMILY MEDICINE

## 2024-01-22 RX ORDER — ATORVASTATIN CALCIUM 40 MG/1
40 TABLET, FILM COATED ORAL NIGHTLY
Qty: 90 TABLET | Refills: 1 | Status: SHIPPED | OUTPATIENT
Start: 2024-01-22 | End: 2024-04-04 | Stop reason: HOSPADM

## 2024-01-22 RX ORDER — HYDROCHLOROTHIAZIDE 25 MG/1
25 TABLET ORAL DAILY
Qty: 90 TABLET | Refills: 1 | Status: SHIPPED | OUTPATIENT
Start: 2024-01-22 | End: 2024-03-19 | Stop reason: ALTCHOICE

## 2024-01-22 RX ORDER — VALSARTAN 160 MG/1
160 TABLET ORAL 2 TIMES DAILY
Qty: 180 TABLET | Refills: 1 | Status: SHIPPED | OUTPATIENT
Start: 2024-01-22 | End: 2024-03-07 | Stop reason: SDUPTHER

## 2024-01-22 RX ORDER — AMLODIPINE BESYLATE 5 MG/1
5 TABLET ORAL DAILY
Qty: 90 TABLET | Refills: 1 | Status: ON HOLD | OUTPATIENT
Start: 2024-01-22 | End: 2024-04-04 | Stop reason: SDUPTHER

## 2024-01-22 RX ORDER — EZETIMIBE 10 MG/1
10 TABLET ORAL DAILY
Qty: 90 TABLET | Refills: 1 | Status: SHIPPED | OUTPATIENT
Start: 2024-01-22 | End: 2024-07-20

## 2024-01-22 RX ORDER — AMLODIPINE BESYLATE 5 MG/1
5 TABLET ORAL DAILY
Qty: 30 TABLET | Refills: 0 | Status: SHIPPED | OUTPATIENT
Start: 2024-01-22 | End: 2024-01-22 | Stop reason: SDUPTHER

## 2024-01-22 RX ORDER — METOPROLOL TARTRATE 50 MG/1
50 TABLET ORAL 2 TIMES DAILY
Qty: 180 TABLET | Refills: 1 | Status: SHIPPED | OUTPATIENT
Start: 2024-01-22 | End: 2024-03-07 | Stop reason: SDUPTHER

## 2024-01-22 RX ORDER — MUPIROCIN 20 MG/G
OINTMENT TOPICAL
COMMUNITY
Start: 2023-12-19 | End: 2024-04-03 | Stop reason: ALTCHOICE

## 2024-01-22 RX ORDER — ATORVASTATIN CALCIUM 40 MG/1
40 TABLET, FILM COATED ORAL NIGHTLY
Qty: 30 TABLET | Refills: 0 | Status: SHIPPED | OUTPATIENT
Start: 2024-01-22 | End: 2024-01-22 | Stop reason: SDUPTHER

## 2024-01-22 ASSESSMENT — PATIENT HEALTH QUESTIONNAIRE - PHQ9
2. FEELING DOWN, DEPRESSED OR HOPELESS: NOT AT ALL
SUM OF ALL RESPONSES TO PHQ9 QUESTIONS 1 AND 2: 0
1. LITTLE INTEREST OR PLEASURE IN DOING THINGS: NOT AT ALL

## 2024-01-22 ASSESSMENT — ENCOUNTER SYMPTOMS
LOSS OF SENSATION IN FEET: 0
DEPRESSION: 0
OCCASIONAL FEELINGS OF UNSTEADINESS: 0

## 2024-01-22 NOTE — PROGRESS NOTES
Subjective   Patient ID: Alexandra Love is a 78 y.o. female who presents for Follow-up (MED FOLLOW UP).    HPI   The patient presents to the clinic for medication follow-up. She has past medical history of hypertension, hyperlipidemia, heart palpitations, and bilateral swelling of feet.    She has been compliant with medication and denies any side-effects to current medication.    Her blood pressure (130/82) was elevated when checked in the clinic today. She does not monitor her blood pressure at home. She continues on valsartan, metoprolol tartrate, amlodipine, and hydrochlorothiazide medications to control her hypertension. She thinks that her blood pressure may have been elevated as she is slightly anxious secondary to her scheduled colonoscopy screening tomorrow (01/23/2024).    The patient reports that she had a mammogram screening on 12/06/2023 and requests for her results to be reviewed.  It was normal    Tolerating statin and Zetia, needs labs in March    Still on glaucoma eye drops, sees eye dr regularly    Had questions about colon prep and discussed today, has regimen for miralx and dulcolox and gatorade etc    Review of Systems    Objective   /82   Pulse 64   Wt 79.8 kg (176 lb)   SpO2 94%   BMI 32.19 kg/m²     Physical Exam  Constitutional:       Appearance: Normal appearance.   Neck:      Vascular: No carotid bruit.   Cardiovascular:      Rate and Rhythm: Normal rate and regular rhythm.      Pulses: Normal pulses.      Heart sounds: Normal heart sounds.   Pulmonary:      Effort: Pulmonary effort is normal.      Breath sounds: Normal breath sounds.   Musculoskeletal:         General: Normal range of motion.      Cervical back: Normal range of motion.      Right lower leg: No edema.      Left lower leg: No edema.   Skin:     General: Skin is warm and dry.   Neurological:      Mental Status: She is alert and oriented to person, place, and time.   Psychiatric:         Mood and Affect: Mood normal.          Thought Content: Thought content normal.         Judgment: Judgment normal.         Assessment/Plan   Problem List Items Addressed This Visit             ICD-10-CM    Hyperglycemia - Primary R73.9    Hyperlipidemia E78.5    Relevant Medications    valsartan (Diovan) 160 mg tablet    ezetimibe (Zetia) 10 mg tablet    atorvastatin (Lipitor) 40 mg tablet    Other Relevant Orders    Comprehensive Metabolic Panel    Lipid Panel    Hypertension I10    Relevant Medications    hydroCHLOROthiazide (HYDRODiuril) 25 mg tablet    Other Relevant Orders    Comprehensive Metabolic Panel    Mass of left thigh R22.42    Relevant Medications    metoprolol tartrate (Lopressor) 50 mg tablet    amLODIPine (Norvasc) 5 mg tablet          Labs (CMP, lipid panel) were ordered for the patient to complete before her next visit.    The patient's mammogram results (12/06/2023) were briefly reviewed. She was informed that her results were normal and she was recommended to have a repeat screening in 1 year.    Preparation for her scheduled colonoscopy screening (01/23/2024) was discussed with the patient.     The patient received refills for current medication (atorvastatin 40 mg, ezetimibe 10 mg, hydrochlorothiazide 25 mg, metoprolol tartrate 50 mg, valsartan 160 mg). Continue taking medication as previously directed.    In regards to her hypertension, the patient was advised to purchase a blood pressure cuff that fits her upper arm and to begin monitoring blood pressure at home. She was advised to continue taking valsartan, metoprolol tartrate, amlodipine, and hydrochlorothiazide medications as previously directed. Continue monitoring symptoms for improvement/exacerbation.    She will follow-up in 2-3 months, unless otherwise needed.  Will review labs and recheck bp.  She will bring in cuff and readings    Scribe Attestation  By signing my name below, I, Jose Juan Guajardo , Scribe   attest that this documentation has been prepared under the  direction and in the presence of Pratima Albert DO.

## 2024-01-23 ENCOUNTER — HOSPITAL ENCOUNTER (OUTPATIENT)
Dept: GASTROENTEROLOGY | Facility: HOSPITAL | Age: 79
Setting detail: OUTPATIENT SURGERY
Discharge: HOME | End: 2024-01-23
Payer: MEDICARE

## 2024-01-23 VITALS
DIASTOLIC BLOOD PRESSURE: 71 MMHG | BODY MASS INDEX: 32.66 KG/M2 | RESPIRATION RATE: 12 BRPM | HEIGHT: 62 IN | TEMPERATURE: 96.8 F | SYSTOLIC BLOOD PRESSURE: 109 MMHG | WEIGHT: 177.47 LBS | HEART RATE: 59 BPM | OXYGEN SATURATION: 95 %

## 2024-01-23 DIAGNOSIS — K63.5 POLYP OF COLON, UNSPECIFIED PART OF COLON, UNSPECIFIED TYPE: ICD-10-CM

## 2024-01-23 DIAGNOSIS — Z12.11 SCREENING FOR COLON CANCER: Primary | ICD-10-CM

## 2024-01-23 PROCEDURE — G0500 MOD SEDAT ENDO SERVICE >5YRS: HCPCS | Performed by: INTERNAL MEDICINE

## 2024-01-23 PROCEDURE — G0121 COLON CA SCRN NOT HI RSK IND: HCPCS | Performed by: INTERNAL MEDICINE

## 2024-01-23 PROCEDURE — 7100000010 HC PHASE TWO TIME - EACH INCREMENTAL 1 MINUTE

## 2024-01-23 PROCEDURE — 3700000013 HC SEDATION LEVEL 5+ TIME - EACH ADDITIONAL 15 MINUTES

## 2024-01-23 PROCEDURE — 2500000004 HC RX 250 GENERAL PHARMACY W/ HCPCS (ALT 636 FOR OP/ED): Performed by: INTERNAL MEDICINE

## 2024-01-23 PROCEDURE — 45378 DIAGNOSTIC COLONOSCOPY: CPT | Performed by: INTERNAL MEDICINE

## 2024-01-23 PROCEDURE — 7100000009 HC PHASE TWO TIME - INITIAL BASE CHARGE

## 2024-01-23 PROCEDURE — 99153 MOD SED SAME PHYS/QHP EA: CPT | Performed by: INTERNAL MEDICINE

## 2024-01-23 PROCEDURE — 99153 MOD SED SAME PHYS/QHP EA: CPT

## 2024-01-23 PROCEDURE — 3700000012 HC SEDATION LEVEL 5+ TIME - INITIAL 15 MINUTES 5/> YEARS

## 2024-01-23 RX ORDER — MEPERIDINE HYDROCHLORIDE 25 MG/ML
INJECTION INTRAMUSCULAR; INTRAVENOUS; SUBCUTANEOUS AS NEEDED
Status: COMPLETED | OUTPATIENT
Start: 2024-01-23 | End: 2024-01-23

## 2024-01-23 RX ORDER — MIDAZOLAM HYDROCHLORIDE 1 MG/ML
INJECTION, SOLUTION INTRAMUSCULAR; INTRAVENOUS AS NEEDED
Status: COMPLETED | OUTPATIENT
Start: 2024-01-23 | End: 2024-01-23

## 2024-01-23 RX ADMIN — MIDAZOLAM HYDROCHLORIDE 2 MG: 1 INJECTION INTRAMUSCULAR; INTRAVENOUS at 10:11

## 2024-01-23 RX ADMIN — MEPERIDINE HYDROCHLORIDE 50 MG: 25 INJECTION INTRAMUSCULAR; INTRAVENOUS; SUBCUTANEOUS at 10:11

## 2024-01-23 ASSESSMENT — PAIN SCALES - GENERAL
PAINLEVEL_OUTOF10: 0 - NO PAIN

## 2024-01-23 ASSESSMENT — PAIN - FUNCTIONAL ASSESSMENT: PAIN_FUNCTIONAL_ASSESSMENT: 0-10

## 2024-01-23 ASSESSMENT — COLUMBIA-SUICIDE SEVERITY RATING SCALE - C-SSRS
1. IN THE PAST MONTH, HAVE YOU WISHED YOU WERE DEAD OR WISHED YOU COULD GO TO SLEEP AND NOT WAKE UP?: NO
6. HAVE YOU EVER DONE ANYTHING, STARTED TO DO ANYTHING, OR PREPARED TO DO ANYTHING TO END YOUR LIFE?: NO
2. HAVE YOU ACTUALLY HAD ANY THOUGHTS OF KILLING YOURSELF?: NO

## 2024-01-23 NOTE — H&P
History Of Present Illness  Alexandra Love is a 78 y.o. female presenting with personal history of reported adenomatous polyp(s) and family history of colon cancer for open-access surveillance colonoscopy.     Past Medical History  Past Medical History:   Diagnosis Date    Colon polyp     Delayed emergence from general anesthesia     Dysuria 2016    Burning with urination    Encounter for examination of blood pressure without abnormal findings 2015    Blood pressure check    Encounter for examination of blood pressure without abnormal findings 2015    Blood pressure check    Hyperlipidemia     Hypertension     Other conditions influencing health status 2015    Follow-up arranged    Personal history of colonic polyps 2018    History of colon polyps    Personal history of other diseases of the circulatory system 2018    History of hypertension    Personal history of other diseases of the circulatory system 2016    History of hypertension    Rash and other nonspecific skin eruption 2022    Rash    Unspecified abdominal pain 2018    Abdominal pain     Surgical History  Past Surgical History:   Procedure Laterality Date     SECTION, LOW TRANSVERSE      TOTAL ABDOMINAL HYSTERECTOMY W/ BILATERAL SALPINGOOPHORECTOMY  2014    Total Abdominal Hysterectomy With Removal Of Both Ovaries     Social History  She reports that she has never smoked. She has never used smokeless tobacco. She reports that she does not currently use alcohol. She reports that she does not currently use drugs.    Family History  Family History   Problem Relation Name Age of Onset    Ovarian cancer Mother      Cancer Father          gastric cancer    Colon cancer Sister      Diabetes Other      Breast cancer Neg Hx          Allergies  Allergies   Allergen Reactions    Other Unknown     Loop diuretics      Sulfa (Sulfonamide Antibiotics) Unknown    Sulfamethoxazole Other    Sulfonylureas  "Unknown     Sulfones      Thiazides Unknown     Type diuretics       Pre-sedation Evaluation:  ASA Classification - ASA 3 - Patient with moderate systemic disease with functional limitations  Mallampati Score - II (hard and soft palate, upper portion of tonsils anduvula visible)    Physical Exam  Constitutional:       Appearance: Normal appearance.   HENT:      Mouth/Throat:      Mouth: Mucous membranes are moist.   Eyes:      Conjunctiva/sclera: Conjunctivae normal.   Cardiovascular:      Rate and Rhythm: Normal rate.   Pulmonary:      Effort: Pulmonary effort is normal.   Abdominal:      Palpations: Abdomen is soft.   Skin:     General: Skin is warm.   Neurological:      Mental Status: She is oriented to person, place, and time.   Psychiatric:         Mood and Affect: Mood normal.          Last Recorded Vitals  Blood pressure 162/90, pulse 82, temperature 36.3 °C (97.3 °F), temperature source Temporal, resp. rate 16, height 1.575 m (5' 2\"), weight 80.5 kg (177 lb 7.5 oz), SpO2 96 %.     Assessment/Plan   personal history of reported adenomatous polyp(s) and family history of colon cancer for open-access surveillance colonoscopy.     PTA/Current Medications:  (Not in a hospital admission)    Current Outpatient Medications   Medication Sig Dispense Refill    amLODIPine (Norvasc) 5 mg tablet Take 1 tablet (5 mg) by mouth once daily. 90 tablet 1    ascorbic acid, vitamin C, 500 mg capsule Take 1 capsule by mouth once daily.      aspirin 81 mg EC tablet Take 1 tablet (81 mg) by mouth once daily.      atorvastatin (Lipitor) 40 mg tablet Take 1 tablet (40 mg) by mouth once daily at bedtime. 90 tablet 1    ergocalciferol, vitamin D2, (CALCIFEROL ORAL) Vitamin D 1000 units caps---take as directed      ezetimibe (Zetia) 10 mg tablet Take 1 tablet (10 mg) by mouth once daily. 90 tablet 1    hydroCHLOROthiazide (HYDRODiuril) 25 mg tablet Take 1 tablet (25 mg) by mouth once daily. 90 tablet 1    latanoprost (Xalatan) 0.005 % " ophthalmic solution Administer 1 drop into both eyes once daily at bedtime. As directed      metoprolol tartrate (Lopressor) 50 mg tablet Take 1 tablet by mouth 2 times a day. 180 tablet 1    valsartan (Diovan) 160 mg tablet Take 1 tablet (160 mg) by mouth 2 times a day. 180 tablet 1    albuterol 90 mcg/actuation inhaler Inhale 1-2 puffs every 6 hours if needed.      co-enzyme Q-10 50 mg capsule Take 1 capsule (50 mg) by mouth once daily.      mupirocin (Bactroban) 2 % ointment APPLY TO 3-4X DAILY TO AFFECTED AREA       No current facility-administered medications for this encounter.     Danis Teague MD

## 2024-01-23 NOTE — DISCHARGE INSTRUCTIONS
Patient Instructions after a Colonoscopy      The anesthetics, sedatives or narcotics which were given to you today will be acting in your body for the next 24 hours, so you might feel a little sleepy or groggy.  This feeling should slowly wear off. Carefully read and follow the instructions.     You received sedation today:  - Do not drive or operate any machinery or power tools of any kind.   - No alcoholic beverages today, not even beer or wine.  - Do not make any important decisions or sign any legal documents.  - No over the counter medications that contain alcohol or that may cause drowsiness.  - Do not make any important decisions or sign any legal documents.    While it is common to experience mild to moderate abdominal distention, gas, or belching after your procedure, if any of these symptoms occur following discharge from the GI Lab or within one week of having your procedure, call the [facility] to be advised whether a visit to your nearest Urgent Care or Emergency Department is indicated.  Take this paper with you if you go.     - If you develop an allergic reaction to the medications that were given during your procedure such as difficulty breathing, rash, hives, severe nausea, vomiting or lightheadedness.  - If you experience chest pain, shortness of breath, severe abdominal pain, fevers and chills.  -If you develop signs and symptoms of bleeding such as blood in your spit, if your stools turn black, tarry, or bloody  - If you have not urinated within 8 hours following your procedure.  - If your IV site becomes painful, red, inflamed, or looks infected.    If you received a biopsy/polypectomy/sphincterotomy the following instructions apply below:    __ Do not use Aspirin containing products, non-steroidal medications or anti-coagulants for one week following your procedure. (Examples of these types of medications are: Advil, Arthrotec, Aleve, Coumadin, Ecotrin, Heparin, Ibuprofen,  Indocin, Motrin, Naprosyn, Nuprin, Plavix, Vioxx, and Voltarin, or their generic forms.  This list is not all-inclusive.  Check with your physician or pharmacist before resuming medications.)   __ Eat a soft diet today.  Avoid foods that are poorly digested for the next 24 hours.  These foods would include: nuts, beans, lettuce, red meats, and fried foods. Start with liquids and advance your diet as tolerated, gradually work up to eating solids.   __ Do not have a Barium Study or Enema for one week.    Your physician recommends the additional following instructions:    -You have a contact number available for emergencies. The signs and symptoms of potential delayed complications were discussed with you. You may return to normal activities tomorrow.  -Resume your previous diet.  -Continue your present medications.   -We are waiting for your pathology results.  -Your physician has recommended a repeat colonoscopy (date to be determined after pending pathology results are reviewed) for surveillance based on pathology results.  -The findings and recommendations have been discussed with you.  -The findings and recommendations were discussed with your family.  - Please see Medication Reconciliation Form for new medication/medications prescribed.       If you experience any problems or have any questions following discharge from the GI Lab, please call:        Nurse Signature                                                                        Date___________________                                                                            Patient/Responsible Party Signature                                        Date___________________

## 2024-03-07 DIAGNOSIS — E78.2 MIXED HYPERLIPIDEMIA: ICD-10-CM

## 2024-03-07 DIAGNOSIS — R22.42 MASS OF LEFT THIGH: ICD-10-CM

## 2024-03-07 RX ORDER — METOPROLOL TARTRATE 50 MG/1
50 TABLET ORAL 2 TIMES DAILY
Qty: 180 TABLET | Refills: 1 | Status: SHIPPED | OUTPATIENT
Start: 2024-03-07 | End: 2024-04-04 | Stop reason: HOSPADM

## 2024-03-07 RX ORDER — VALSARTAN 160 MG/1
160 TABLET ORAL 2 TIMES DAILY
Qty: 180 TABLET | Refills: 1 | Status: SHIPPED | OUTPATIENT
Start: 2024-03-07 | End: 2024-09-03

## 2024-03-14 ENCOUNTER — APPOINTMENT (OUTPATIENT)
Dept: PRIMARY CARE | Facility: CLINIC | Age: 79
End: 2024-03-14
Payer: MEDICARE

## 2024-03-19 ENCOUNTER — OFFICE VISIT (OUTPATIENT)
Dept: CARDIOLOGY | Facility: HOSPITAL | Age: 79
End: 2024-03-19
Payer: MEDICARE

## 2024-03-19 VITALS
WEIGHT: 177 LBS | DIASTOLIC BLOOD PRESSURE: 84 MMHG | SYSTOLIC BLOOD PRESSURE: 151 MMHG | HEART RATE: 69 BPM | HEIGHT: 61 IN | OXYGEN SATURATION: 98 % | BODY MASS INDEX: 33.42 KG/M2

## 2024-03-19 DIAGNOSIS — I10 PRIMARY HYPERTENSION: ICD-10-CM

## 2024-03-19 DIAGNOSIS — R00.2 PALPITATIONS: Primary | ICD-10-CM

## 2024-03-19 LAB
ATRIAL RATE: 63 BPM
P AXIS: 56 DEGREES
P OFFSET: 181 MS
P ONSET: 117 MS
PR INTERVAL: 214 MS
Q ONSET: 224 MS
QRS COUNT: 10 BEATS
QRS DURATION: 84 MS
QT INTERVAL: 438 MS
QTC CALCULATION(BAZETT): 448 MS
QTC FREDERICIA: 445 MS
R AXIS: 13 DEGREES
T AXIS: 54 DEGREES
T OFFSET: 443 MS
VENTRICULAR RATE: 63 BPM

## 2024-03-19 PROCEDURE — 93010 ELECTROCARDIOGRAM REPORT: CPT | Performed by: INTERNAL MEDICINE

## 2024-03-19 PROCEDURE — 1123F ACP DISCUSS/DSCN MKR DOCD: CPT | Performed by: NURSE PRACTITIONER

## 2024-03-19 PROCEDURE — 99214 OFFICE O/P EST MOD 30 MIN: CPT | Performed by: NURSE PRACTITIONER

## 2024-03-19 PROCEDURE — 3079F DIAST BP 80-89 MM HG: CPT | Performed by: NURSE PRACTITIONER

## 2024-03-19 PROCEDURE — 1159F MED LIST DOCD IN RCRD: CPT | Performed by: NURSE PRACTITIONER

## 2024-03-19 PROCEDURE — 3077F SYST BP >= 140 MM HG: CPT | Performed by: NURSE PRACTITIONER

## 2024-03-19 PROCEDURE — 99214 OFFICE O/P EST MOD 30 MIN: CPT | Mod: 25 | Performed by: NURSE PRACTITIONER

## 2024-03-19 PROCEDURE — 1036F TOBACCO NON-USER: CPT | Performed by: NURSE PRACTITIONER

## 2024-03-19 PROCEDURE — 93005 ELECTROCARDIOGRAM TRACING: CPT | Performed by: NURSE PRACTITIONER

## 2024-03-19 PROCEDURE — 1160F RVW MEDS BY RX/DR IN RCRD: CPT | Performed by: NURSE PRACTITIONER

## 2024-03-19 ASSESSMENT — ENCOUNTER SYMPTOMS: HYPERTENSION: 1

## 2024-03-19 NOTE — PROGRESS NOTES
Subjective   Alexandra Love is a 78 y.o. female.    Chief Complaint:  Hyperlipidemia and Hypertension    Mrs. Love returns for an interval follow up. She has a history of palpitations, and had a much longer episode over the weekend. Her palpitations have subsided. She can not identify any clear triggers. She has remained compliant with her medications. She now believes it was likely related to stress. She offers no other cardiovascular complaints or concerns today. She denies any complaints of chest pain, shortness of breath, lightheadedness, dizziness, palpitations, syncope, orthopnea, paroxysmal nocturnal dyspnea, lower extremity swelling or bleeding concerns.      Hyperlipidemia    Hypertension        Review of Systems   All other systems reviewed and are negative.      Objective   Physical Exam  Constitutional:       Appearance: Healthy appearance. In no distress  Pulmonary:      Effort: Pulmonary effort is normal.      Breath sounds: Normal breath sounds.   Cardiovascular:      Normal rate. Regular rhythm. Normal S1. Normal S2.       Murmurs: There is no murmur.      Carotids: right carotid pulse +2, no bruit heard over the right carotid. left carotid pulse +2, no bruit heard over the left carotid.  Edema:     Peripheral edema absent.   Abdominal:      Palpations: Abdomen is soft.   Musculoskeletal:       Cervical back: Normal range of motion.   Skin:     General: Skin is warm and dry. Normal color and pigmentation   Neurological:      Mental Status: Alert and oriented to person, place and time.   Psychiatric:     Mood and Affect: appropriate mood and appropriate affect.     EKG obtained and reviewed. Sinus rhythm with 1st degree AV block. HR 63    Lab Review:   Lab Results   Component Value Date     09/07/2023    K 4.0 09/07/2023     09/07/2023    CO2 30 09/07/2023    BUN 13 09/07/2023    CREATININE 0.70 09/07/2023    GLUCOSE 89 09/07/2023    CALCIUM 10.0 09/07/2023     Lab Results   Component  Value Date    WBC 7.4 09/07/2023    HGB 13.8 09/07/2023    HCT 44.9 09/07/2023    MCV 85 09/07/2023     09/07/2023     Lab Results   Component Value Date    CHOL 142 09/07/2023    TRIG 81 09/07/2023    HDL 55.9 09/07/2023       Assessment/Plan   Mrs. Love is a pleasant 78 year old  female with a past medical history significant for hypertension, hyperlipidemia and palpitations. Echocardiogram in 2018 showed an EF of 60-65%. NM stress test 10/2022 showed no evidence of ischemia with normal LVEF. She presents today for routine follow up stable from a cardiac standpoint. Her VS and EKG remain stable. I will have her continue all medications unchanged. Should she continue to struggle with more frequent palpitations, we discussed a holter monitor. She will follow up with us in clinic in 6 months. She knows to call for any concerns.

## 2024-04-01 ENCOUNTER — TELEPHONE (OUTPATIENT)
Dept: PRIMARY CARE | Facility: CLINIC | Age: 79
End: 2024-04-01
Payer: MEDICARE

## 2024-04-01 NOTE — TELEPHONE ENCOUNTER
PATIENT CALLED AND SAID SHE USE TO GET HER MEDS FROM [facility] AND NOW IT IS FROM [facility] AND SHE SAID HER MOTOPROLOL TARTRATE IS 50MG BID AND THE MANUFACTURE IS DIFFERENT COULD THAT BE WHY SHE HAS SOB AND SHAKING BECAUSE THAT IS THEY ONLY THING SHE IS DOING DIFFERENT SHE WAS ADVISED TO GO TO THE ER AND TO CALL HER CARDIOLOGIST TO SEE IF HE CAN SHE HER IF NOT SHE NEEDS TO GO THE ER. SHE SAID SHE EVEN CALLED THE EMT THE OTHER NIGHT BECAUSE IT WAS BAD. I DID JUST GET OFF THE PHONE WITH HER AND SHE SAID SHE WILL CALL CARDIO FIRST IF SHE CAN'T SEE THEM SHE WILL GO TO THE ER.

## 2024-04-02 ENCOUNTER — HOSPITAL ENCOUNTER (INPATIENT)
Facility: HOSPITAL | Age: 79
LOS: 2 days | Discharge: HOME | DRG: 069 | End: 2024-04-04
Attending: INTERNAL MEDICINE | Admitting: INTERNAL MEDICINE
Payer: MEDICARE

## 2024-04-02 ENCOUNTER — APPOINTMENT (OUTPATIENT)
Dept: RADIOLOGY | Facility: HOSPITAL | Age: 79
DRG: 069 | End: 2024-04-02
Payer: MEDICARE

## 2024-04-02 ENCOUNTER — APPOINTMENT (OUTPATIENT)
Dept: CARDIOLOGY | Facility: HOSPITAL | Age: 79
DRG: 069 | End: 2024-04-02
Payer: MEDICARE

## 2024-04-02 DIAGNOSIS — G45.9 TIA (TRANSIENT ISCHEMIC ATTACK): Primary | ICD-10-CM

## 2024-04-02 DIAGNOSIS — R00.2 PALPITATIONS: ICD-10-CM

## 2024-04-02 DIAGNOSIS — R22.42 MASS OF LEFT THIGH: ICD-10-CM

## 2024-04-02 DIAGNOSIS — I63.9 CEREBRAL INFARCTION, UNSPECIFIED (MULTI): ICD-10-CM

## 2024-04-02 LAB
ALBUMIN SERPL BCP-MCNC: 4.5 G/DL (ref 3.4–5)
ALP SERPL-CCNC: 50 U/L (ref 33–136)
ALT SERPL W P-5'-P-CCNC: 18 U/L (ref 7–45)
ANION GAP SERPL CALC-SCNC: 22 MMOL/L (ref 10–20)
AST SERPL W P-5'-P-CCNC: 24 U/L (ref 9–39)
ATRIAL RATE: 77 BPM
BASOPHILS # BLD AUTO: 0.07 X10*3/UL (ref 0–0.1)
BASOPHILS NFR BLD AUTO: 0.9 %
BILIRUB SERPL-MCNC: 1.3 MG/DL (ref 0–1.2)
BNP SERPL-MCNC: 54 PG/ML (ref 0–99)
BUN SERPL-MCNC: 11 MG/DL (ref 6–23)
CALCIUM SERPL-MCNC: 10.1 MG/DL (ref 8.6–10.3)
CARDIAC TROPONIN I PNL SERPL HS: 5 NG/L (ref 0–13)
CARDIAC TROPONIN I PNL SERPL HS: 9 NG/L (ref 0–13)
CHLORIDE SERPL-SCNC: 101 MMOL/L (ref 98–107)
CHOLEST SERPL-MCNC: 199 MG/DL (ref 0–199)
CHOLESTEROL/HDL RATIO: 3.5
CO2 SERPL-SCNC: 19 MMOL/L (ref 21–32)
CREAT SERPL-MCNC: 0.75 MG/DL (ref 0.5–1.05)
EGFRCR SERPLBLD CKD-EPI 2021: 83 ML/MIN/1.73M*2
EOSINOPHIL # BLD AUTO: 0.17 X10*3/UL (ref 0–0.4)
EOSINOPHIL NFR BLD AUTO: 2.1 %
ERYTHROCYTE [DISTWIDTH] IN BLOOD BY AUTOMATED COUNT: 14.6 % (ref 11.5–14.5)
GLUCOSE BLD MANUAL STRIP-MCNC: 92 MG/DL (ref 74–99)
GLUCOSE SERPL-MCNC: 119 MG/DL (ref 74–99)
HCT VFR BLD AUTO: 46.2 % (ref 36–46)
HDLC SERPL-MCNC: 57.1 MG/DL
HGB BLD-MCNC: 15.3 G/DL (ref 12–16)
IMM GRANULOCYTES # BLD AUTO: 0.02 X10*3/UL (ref 0–0.5)
IMM GRANULOCYTES NFR BLD AUTO: 0.2 % (ref 0–0.9)
LDLC SERPL CALC-MCNC: 122 MG/DL
LIPASE SERPL-CCNC: 17 U/L (ref 9–82)
LYMPHOCYTES # BLD AUTO: 4.04 X10*3/UL (ref 0.8–3)
LYMPHOCYTES NFR BLD AUTO: 49.7 %
MAGNESIUM SERPL-MCNC: 2 MG/DL (ref 1.6–2.4)
MCH RBC QN AUTO: 27 PG (ref 26–34)
MCHC RBC AUTO-ENTMCNC: 33.1 G/DL (ref 32–36)
MCV RBC AUTO: 82 FL (ref 80–100)
MONOCYTES # BLD AUTO: 0.54 X10*3/UL (ref 0.05–0.8)
MONOCYTES NFR BLD AUTO: 6.6 %
NEUTROPHILS # BLD AUTO: 3.29 X10*3/UL (ref 1.6–5.5)
NEUTROPHILS NFR BLD AUTO: 40.5 %
NON HDL CHOLESTEROL: 142 MG/DL (ref 0–149)
NRBC BLD-RTO: 0 /100 WBCS (ref 0–0)
P AXIS: 47 DEGREES
P OFFSET: 185 MS
P ONSET: 121 MS
PLATELET # BLD AUTO: 344 X10*3/UL (ref 150–450)
POTASSIUM SERPL-SCNC: 3.9 MMOL/L (ref 3.5–5.3)
PR INTERVAL: 206 MS
PROT SERPL-MCNC: 8.3 G/DL (ref 6.4–8.2)
Q ONSET: 224 MS
QRS COUNT: 12 BEATS
QRS DURATION: 86 MS
QT INTERVAL: 422 MS
QTC CALCULATION(BAZETT): 477 MS
QTC FREDERICIA: 458 MS
R AXIS: 25 DEGREES
RBC # BLD AUTO: 5.67 X10*6/UL (ref 4–5.2)
S PYO DNA THROAT QL NAA+PROBE: NOT DETECTED
SODIUM SERPL-SCNC: 138 MMOL/L (ref 136–145)
T AXIS: 48 DEGREES
T OFFSET: 435 MS
TRIGL SERPL-MCNC: 98 MG/DL (ref 0–149)
VENTRICULAR RATE: 77 BPM
VLDL: 20 MG/DL (ref 0–40)
WBC # BLD AUTO: 8.1 X10*3/UL (ref 4.4–11.3)

## 2024-04-02 PROCEDURE — 80053 COMPREHEN METABOLIC PANEL: CPT | Performed by: INTERNAL MEDICINE

## 2024-04-02 PROCEDURE — 83690 ASSAY OF LIPASE: CPT | Performed by: NURSE PRACTITIONER

## 2024-04-02 PROCEDURE — 70496 CT ANGIOGRAPHY HEAD: CPT

## 2024-04-02 PROCEDURE — 84484 ASSAY OF TROPONIN QUANT: CPT | Performed by: EMERGENCY MEDICINE

## 2024-04-02 PROCEDURE — 71046 X-RAY EXAM CHEST 2 VIEWS: CPT | Performed by: RADIOLOGY

## 2024-04-02 PROCEDURE — 80053 COMPREHEN METABOLIC PANEL: CPT | Performed by: STUDENT IN AN ORGANIZED HEALTH CARE EDUCATION/TRAINING PROGRAM

## 2024-04-02 PROCEDURE — 85025 COMPLETE CBC W/AUTO DIFF WBC: CPT | Performed by: INTERNAL MEDICINE

## 2024-04-02 PROCEDURE — 93005 ELECTROCARDIOGRAM TRACING: CPT

## 2024-04-02 PROCEDURE — 84443 ASSAY THYROID STIM HORMONE: CPT | Performed by: NURSE PRACTITIONER

## 2024-04-02 PROCEDURE — 84484 ASSAY OF TROPONIN QUANT: CPT | Performed by: INTERNAL MEDICINE

## 2024-04-02 PROCEDURE — 70498 CT ANGIOGRAPHY NECK: CPT

## 2024-04-02 PROCEDURE — 99285 EMERGENCY DEPT VISIT HI MDM: CPT | Mod: 25

## 2024-04-02 PROCEDURE — 80053 COMPREHEN METABOLIC PANEL: CPT | Performed by: EMERGENCY MEDICINE

## 2024-04-02 PROCEDURE — 36415 COLL VENOUS BLD VENIPUNCTURE: CPT | Performed by: EMERGENCY MEDICINE

## 2024-04-02 PROCEDURE — 82947 ASSAY GLUCOSE BLOOD QUANT: CPT

## 2024-04-02 PROCEDURE — 83735 ASSAY OF MAGNESIUM: CPT

## 2024-04-02 PROCEDURE — 85025 COMPLETE CBC W/AUTO DIFF WBC: CPT | Performed by: STUDENT IN AN ORGANIZED HEALTH CARE EDUCATION/TRAINING PROGRAM

## 2024-04-02 PROCEDURE — 70450 CT HEAD/BRAIN W/O DYE: CPT

## 2024-04-02 PROCEDURE — 1200000002 HC GENERAL ROOM WITH TELEMETRY DAILY

## 2024-04-02 PROCEDURE — 83036 HEMOGLOBIN GLYCOSYLATED A1C: CPT | Mod: AHULAB | Performed by: INTERNAL MEDICINE

## 2024-04-02 PROCEDURE — 2500000001 HC RX 250 WO HCPCS SELF ADMINISTERED DRUGS (ALT 637 FOR MEDICARE OP): Performed by: INTERNAL MEDICINE

## 2024-04-02 PROCEDURE — 85025 COMPLETE CBC W/AUTO DIFF WBC: CPT | Performed by: EMERGENCY MEDICINE

## 2024-04-02 PROCEDURE — 70496 CT ANGIOGRAPHY HEAD: CPT | Performed by: RADIOLOGY

## 2024-04-02 PROCEDURE — 70450 CT HEAD/BRAIN W/O DYE: CPT | Performed by: RADIOLOGY

## 2024-04-02 PROCEDURE — 71046 X-RAY EXAM CHEST 2 VIEWS: CPT

## 2024-04-02 PROCEDURE — 87651 STREP A DNA AMP PROBE: CPT | Performed by: INTERNAL MEDICINE

## 2024-04-02 PROCEDURE — 84484 ASSAY OF TROPONIN QUANT: CPT | Performed by: STUDENT IN AN ORGANIZED HEALTH CARE EDUCATION/TRAINING PROGRAM

## 2024-04-02 PROCEDURE — 80061 LIPID PANEL: CPT | Performed by: INTERNAL MEDICINE

## 2024-04-02 PROCEDURE — 36415 COLL VENOUS BLD VENIPUNCTURE: CPT | Performed by: INTERNAL MEDICINE

## 2024-04-02 PROCEDURE — 83880 ASSAY OF NATRIURETIC PEPTIDE: CPT

## 2024-04-02 PROCEDURE — 2550000001 HC RX 255 CONTRASTS: Performed by: INTERNAL MEDICINE

## 2024-04-02 PROCEDURE — 70498 CT ANGIOGRAPHY NECK: CPT | Performed by: RADIOLOGY

## 2024-04-02 RX ORDER — PANTOPRAZOLE SODIUM 40 MG/1
40 TABLET, DELAYED RELEASE ORAL
Status: DISCONTINUED | OUTPATIENT
Start: 2024-04-03 | End: 2024-04-03 | Stop reason: SDUPTHER

## 2024-04-02 RX ORDER — ALUMINUM HYDROXIDE, MAGNESIUM HYDROXIDE, AND SIMETHICONE 1200; 120; 1200 MG/30ML; MG/30ML; MG/30ML
20 SUSPENSION ORAL 4 TIMES DAILY PRN
Status: DISCONTINUED | OUTPATIENT
Start: 2024-04-02 | End: 2024-04-04 | Stop reason: HOSPADM

## 2024-04-02 RX ORDER — TALC
3 POWDER (GRAM) TOPICAL NIGHTLY PRN
Status: DISCONTINUED | OUTPATIENT
Start: 2024-04-02 | End: 2024-04-03 | Stop reason: SDUPTHER

## 2024-04-02 RX ORDER — IPRATROPIUM BROMIDE AND ALBUTEROL SULFATE 2.5; .5 MG/3ML; MG/3ML
3 SOLUTION RESPIRATORY (INHALATION) EVERY 2 HOUR PRN
Status: DISCONTINUED | OUTPATIENT
Start: 2024-04-02 | End: 2024-04-04 | Stop reason: HOSPADM

## 2024-04-02 RX ORDER — TRAMADOL HYDROCHLORIDE 50 MG/1
50 TABLET ORAL EVERY 6 HOURS PRN
Status: DISCONTINUED | OUTPATIENT
Start: 2024-04-02 | End: 2024-04-04 | Stop reason: HOSPADM

## 2024-04-02 RX ORDER — ONDANSETRON HYDROCHLORIDE 2 MG/ML
4 INJECTION, SOLUTION INTRAVENOUS EVERY 6 HOURS PRN
Status: DISCONTINUED | OUTPATIENT
Start: 2024-04-02 | End: 2024-04-03 | Stop reason: SDUPTHER

## 2024-04-02 RX ORDER — ACETAMINOPHEN 325 MG/1
650 TABLET ORAL EVERY 6 HOURS PRN
Status: DISCONTINUED | OUTPATIENT
Start: 2024-04-02 | End: 2024-04-03 | Stop reason: SDUPTHER

## 2024-04-02 RX ORDER — NAPROXEN SODIUM 220 MG/1
81 TABLET, FILM COATED ORAL DAILY
Status: DISCONTINUED | OUTPATIENT
Start: 2024-04-02 | End: 2024-04-03

## 2024-04-02 RX ORDER — ATORVASTATIN CALCIUM 40 MG/1
40 TABLET, FILM COATED ORAL NIGHTLY
Status: DISCONTINUED | OUTPATIENT
Start: 2024-04-02 | End: 2024-04-03

## 2024-04-02 RX ADMIN — ATORVASTATIN CALCIUM 40 MG: 40 TABLET, FILM COATED ORAL at 20:20

## 2024-04-02 RX ADMIN — ASPIRIN 81 MG 81 MG: 81 TABLET ORAL at 20:20

## 2024-04-02 RX ADMIN — IOHEXOL 75 ML: 350 INJECTION, SOLUTION INTRAVENOUS at 17:24

## 2024-04-02 ASSESSMENT — PAIN SCALES - GENERAL
PAINLEVEL_OUTOF10: 0 - NO PAIN
PAINLEVEL_OUTOF10: 0 - NO PAIN

## 2024-04-02 ASSESSMENT — PAIN - FUNCTIONAL ASSESSMENT: PAIN_FUNCTIONAL_ASSESSMENT: 0-10

## 2024-04-02 NOTE — ED NOTES
Per family patient has become confused and having speech difficulties around 4pm.   Patient brought into triage bay to re-eval with provider present.   Patient speaking in full sentences but struggling to finds words at times, breathing equal and unlabored. Patient endorsing headache at this time. Stroke alert called at 1657. [facility] 3.          Mary Ellen Rubio RN  04/02/24 1654       Mary Ellen Rubio RN  04/02/24 6420

## 2024-04-02 NOTE — ED TRIAGE NOTES
This patient was seen in triage.     Vitals are noted.      HPI:  Patient was reevaluated in triage, presents with her family members,  Concerned about shortness of breath and palpitations ever since her medication carrier [facility] changed [facility] over the last 3 weeks.  However while in triage and waiting for a room family reports that patient had an acute mental status change, family states that they got here around 4 PM and noticed that patient was having nonsensical talk while in the bathroom in the waiting room, on reevaluation in the triage room patient was noted to have some expressive aphasia, tremors, stuttering and a headache that started within the last hour while waiting.    Focused PE:   Patient is tremulous, stuttering, having some expressive aphasia and dysarthria, lungs are clear     plan: Stroke alert was paged out from triage and patient was moved to main ED room

## 2024-04-02 NOTE — ED PROVIDER NOTES
HPI     CC: Shortness of Breath     Last Known Well Time: Unclear, symptoms off and on throughout day today    HPI: Alexandra Love is a 78 y.o. female with a history of HTN, HLD, presents with shortness of breath and palpitations.  Symptoms have been present off and on for the past few weeks ever since switching her metoprolol, atorvastatin, amlodipine from brand name from [facility] to generic from [facility].  She really thinks that symptoms are related to this change.  She called EMS last week and they wanted to take her to the ED but she declined.  She also called her doctor on Friday who advised her to go to the ED but she declined again.  This morning she woke up unable to breathe and so finally decided to be evaluated in the ED.  I was called to bedside around 4 PM after stroke alert was called as daughter had arrived and noted that patient had speech difficulty, some aphasia and possible dysarthria.  Per [facility] who saw the patient initially this was not present on their initial evaluation around 1 PM.  According to the daughter, her sister had told her earlier in the day that she similarly had some dysarthria and word finding difficulty.  She thinks it has been going on off and on all day.  The patient herself cannot speak to these complaints.  I did reassess her later in her ED course, after the stroke alert was called, and she is again back to baseline.  She does not remember having speech difficulty.    ROS: 10-point review of systems was performed and is otherwise negative except as noted in HPI.    Limitations to history: N/A    Independent Historians: Daughter at bedside    External Records Reviewed: Outpatient notes in EMR    Past Medical History: Noncontributory except per HPI     Past Surgical History: Noncontributory except per HPI     Family History: Reviewed and noncontributory     Social History:  Denies tobacco. Denies ETOH. Denies illicit drugs.    Social Determinants Affecting Care: N/A    Allergies    Allergen Reactions    Other Unknown     Loop diuretics      Sulfa (Sulfonamide Antibiotics) Unknown    Sulfamethoxazole Other    Sulfonylureas Unknown     Sulfones      Thiazides Unknown     Type diuretics       Home Meds:   Current Outpatient Medications   Medication Instructions    albuterol 90 mcg/actuation inhaler 1-2 puffs, inhalation, Every 6 hours PRN    amLODIPine (NORVASC) 5 mg, oral, Daily    ascorbic acid, vitamin C, 500 mg capsule 1 capsule, oral, Daily    aspirin 81 mg EC tablet 1 tablet, oral, Daily    atorvastatin (LIPITOR) 40 mg, oral, Nightly    co-enzyme Q-10 50 mg capsule 1 capsule, oral, Daily    ergocalciferol, vitamin D2, (CALCIFEROL ORAL) Vitamin D 1000 units caps---take as directed    ezetimibe (ZETIA) 10 mg, oral, Daily    latanoprost (Xalatan) 0.005 % ophthalmic solution 1 drop, Both Eyes, Nightly, As directed    metoprolol tartrate (LOPRESSOR) 50 mg, oral, 2 times daily    mupirocin (Bactroban) 2 % ointment APPLY TO 3-4X DAILY TO AFFECTED AREA    valsartan (DIOVAN) 160 mg, oral, 2 times daily        Physical Exam     ED Triage Vitals [04/02/24 1224]   Temperature Heart Rate Respirations BP   36.3 °C (97.3 °F) 82 20 (!) 177/100      Pulse Ox Temp src Heart Rate Source Patient Position   100 % -- -- --      BP Location FiO2 (%)     -- --         Heart Rate:  [78-82]   Temperature:  [36.3 °C (97.3 °F)-36.4 °C (97.6 °F)]   Respirations:  [20]   BP: (151-177)/()   Pulse Ox:  [99 %-100 %]      Physical Exam  Vitals and nursing note reviewed.     CONSTITUTIONAL: Well appearing, well nourished, in no acute distress.   HENMT: Head atraumatic. Airway patent. Nasal mucosa clear. Mouth with normal mucosa, clear oropharynx. Uvula midline. Neck supple.    EYES: Clear bilaterally, pupils equally round and reactive to light.   CARDIOVASCULAR: Normal rate, regular rhythm.  Heart sounds S1, S2.  No murmurs, rubs or gallops. Normal pulses. Capillary refill < 2 sec.   RESPIRATORY: No increased work of  breathing. Breath sounds clear and equal bilaterally.  GASTROINTESTINAL: Abdomen soft, non-distended, non-tender. No rebound, no guarding. Normal bowel sounds. No palpable masses.  GENITOURINARY:  No CVA tenderness.  MUSCULOSKELETAL: Spine appears normal, range of motion is not limited, no muscle or joint tenderness. No edema.   NEUROLOGICAL: AAO x3. CN II-XII intact except for mild dysarthria, word finding difficulty. 5/5 strength and SILT UEs/LEs. FTN intact but very tremulous.  Gait not assessed.  NIHSS 3 for mild dysarthria, mild aphasia, failure of 1 LOC question.  Speech derangements resolved on reevaluation and able to answer all LOC questions correctly.  SKIN: Warm, dry and intact. No rash or notable lesions.  PSYCHIATRIC: Normal mood and affect.  HEME/LYMPH: No adenopathy or splenomegaly.    Interval: Baseline  Time: 7:06 PM  Person Administering Scale: Wen Padgett MD     1a  Level of consciousness: 0=alert; keenly responsive   1b. LOC questions:  1=Performs one task correctly   1c. LOC commands: 0=Performs both tasks correctly   2.  Best Gaze: 0=normal   3. Visual: 0=No visual loss   4. Facial Palsy: 0=Normal symmetric movement   5a. Motor left arm: 0=No drift, limb holds 90 (or 45) degrees for full 10 seconds   5b.  Motor right arm: 0=No drift, limb holds 90 (or 45) degrees for full 10 seconds   6a. motor left le=No drift, limb holds 90 (or 45) degrees for full 10 seconds   6b  Motor right le=No drift, limb holds 90 (or 45) degrees for full 10 seconds   7. Limb Ataxia: 0=Absent   8.  Sensory: 0=Normal; no sensory loss   9. Best Language:  1=Mild to moderate aphasia; some obvious loss of fluency or facility of comprehension without significant limitation on ideas expressed or form of expression.   10. Dysarthria: 1=Mild to moderate, patient slurs at least some words and at worst, can be understood with some difficulty   11. Extinction and Inattention: 0=No abnormality   12. Distal motor  function: 0=Normal     Total:   3       VAN: VAN: Negative    Diagnostic Results      ECG: ECG read interpreted by me.  Normal sinus rhythm, rate 77.  Normal axis.  Normal intervals.  No ST or T wave derangements.    Labs Reviewed   CBC WITH AUTO DIFFERENTIAL - Abnormal       Result Value    WBC 8.1      nRBC 0.0      RBC 5.67 (*)     Hemoglobin 15.3      Hematocrit 46.2 (*)     MCV 82      MCH 27.0      MCHC 33.1      RDW 14.6 (*)     Platelets 344      Neutrophils % 40.5      Immature Granulocytes %, Automated 0.2      Lymphocytes % 49.7      Monocytes % 6.6      Eosinophils % 2.1      Basophils % 0.9      Neutrophils Absolute 3.29      Immature Granulocytes Absolute, Automated 0.02      Lymphocytes Absolute 4.04 (*)     Monocytes Absolute 0.54      Eosinophils Absolute 0.17      Basophils Absolute 0.07     COMPREHENSIVE METABOLIC PANEL - Abnormal    Glucose 119 (*)     Sodium 138      Potassium 3.9      Chloride 101      Bicarbonate 19 (*)     Anion Gap 22 (*)     Urea Nitrogen 11      Creatinine 0.75      eGFR 83      Calcium 10.1      Albumin 4.5      Alkaline Phosphatase 50      Total Protein 8.3 (*)     AST 24      Bilirubin, Total 1.3 (*)     ALT 18     SERIAL TROPONIN-INITIAL - Normal    Troponin I, High Sensitivity 5      Narrative:     Less than 99th percentile of normal range cutoff-  Female and children under 18 years old <14 ng/L; Male <21 ng/L: Negative  Repeat testing should be performed if clinically indicated.     Female and children under 18 years old 14-50 ng/L; Male 21-50 ng/L:  Consistent with possible cardiac damage and possible increased clinical   risk. Serial measurements may help to assess extent of myocardial damage.     >50 ng/L: Consistent with cardiac damage, increased clinical risk and  myocardial infarction. Serial measurements may help assess extent of   myocardial damage.      NOTE: Children less than 1 year old may have higher baseline troponin   levels and results should be  interpreted in conjunction with the overall   clinical context.     NOTE: Troponin I testing is performed using a different   testing methodology at [facility] than at other   [facility]. Direct result comparisons should only   be made within the same method.   SERIAL TROPONIN, 1 HOUR - Normal    Troponin I, High Sensitivity 9      Narrative:     Less than 99th percentile of normal range cutoff-  Female and children under 18 years old <14 ng/L; Male <21 ng/L: Negative  Repeat testing should be performed if clinically indicated.     Female and children under 18 years old 14-50 ng/L; Male 21-50 ng/L:  Consistent with possible cardiac damage and possible increased clinical   risk. Serial measurements may help to assess extent of myocardial damage.     >50 ng/L: Consistent with cardiac damage, increased clinical risk and  myocardial infarction. Serial measurements may help assess extent of   myocardial damage.      NOTE: Children less than 1 year old may have higher baseline troponin   levels and results should be interpreted in conjunction with the overall   clinical context.     NOTE: Troponin I testing is performed using a different   testing methodology at [facility] than at other   [facility]. Direct result comparisons should only   be made within the same method.   B-TYPE NATRIURETIC PEPTIDE - Normal    BNP 54      Narrative:        <100 pg/mL - Heart failure unlikely  100-299 pg/mL - Intermediate probability of acute heart                  failure exacerbation. Correlate with clinical                  context and patient history.    >=300 pg/mL - Heart Failure likely. Correlate with clinical                  context and patient history.    BNP testing is performed using different testing methodology at [facility] than at other [facility]. Direct result comparisons should only be made within the same method.      MAGNESIUM - Normal    Magnesium 2.00      LIPASE - Normal    Lipase 17      Narrative:     Venipuncture immediately after or during the administration of Metamizole may lead to falsely low results. Testing should be performed immediately prior to Metamizole dosing.   POCT GLUCOSE - Normal    POCT Glucose 92     GROUP A STREPTOCOCCUS, PCR   TROPONIN SERIES- (INITIAL, 1 HR)    Narrative:     The following orders were created for panel order Troponin Series, (0, 1 HR).  Procedure                               Abnormality         Status                     ---------                               -----------         ------                     Troponin I, High Sensiti...[[id number]]  Normal              Final result               Troponin, High Sensitivi...[[id number]]  Normal              Final result                 Please view results for these tests on the individual orders.         CT angio brain attack neck w IV contrast and post procedure   Final Result   CTA neck:        No evidence for significant stenosis of the cervical vessels.        There is prominence of the palatine tonsils bilaterally. There is a   fluid collection within the right palatine tonsil measuring 1.4 x 1.0   cm.        Multiple subcentimeter nodules within the thyroid gland.        CTA head:        No evidence for significant stenosis or large branch vessel cutoffs   of the intracranial vessels.        MACRO:   None        Signed by: Jess Stout 4/2/2024 5:38 PM   Dictation workstation:   [id number]      CT brain attack head wo IV contrast   Final Result   No evidence of acute cortical infarct or intracranial hemorrhage.             MACRO:   Liam Sanders discussed the significance and urgency of this   critical finding by secure chat with  NENO DENG on 4/2/2024 at   5:34 pm.  (**-RCF-**) Findings:  See findings.             Signed by: Liam Sanders 4/2/2024 5:34 PM   Dictation workstation:   [id number]      XR chest 2 views   Final Result   No acute cardiopulmonary  process.        MACRO:   None        Signed by: Bevelry Adriana 4/2/2024 1:03 PM   Dictation workstation:   [id number]      CT angio brain attack head w IV contrast and post procedure    (Results Pending)                 Bebe Coma Scale Score: 15         NIH Stroke Scale: 3          Procedure  Critical Care    Performed by: Wen Padgett MD  Authorized by: Wen Padgett MD    Critical care provider statement:     Critical care time (minutes):  25    Critical care time was exclusive of:  Separately billable procedures and treating other patients and teaching time    Critical care was necessary to treat or prevent imminent or life-threatening deterioration of the following conditions:  CNS failure or compromise    Critical care was time spent personally by me on the following activities:  Development of treatment plan with patient or surrogate, evaluation of patient's response to treatment, examination of patient, obtaining history from patient or surrogate, ordering and performing treatments and interventions, ordering and review of laboratory studies, ordering and review of radiographic studies, pulse oximetry, re-evaluation of patient's condition and review of old charts    Care discussed with: admitting provider        ED Course & MDM   Assessment/Plan:   Alexandra Love is a 78 y.o. female with a history of HTN, HLD, presents with shortness of breath and palpitations after switching pharmacies a few weeks ago.  She is apparently having intermittent episodes of speech difficulty today prompting a stroke alert to be called, NIHSS 3, but they are waxing and waning so TNK was not given.  She is notably hypertensive.  Exam is otherwise unremarkable.  Workup was already been mostly completed at the time of my seeing her.  CBC is notable for normal WBC 8.1, normal H&H, normal platelets, CMP with low bicarb 19 and anion gap 22, normal renal function, negative troponin x 2, normal magnesium, normal lipase,  normal BNP.  Chest x-ray shows no acute cardiopulmonary disease. CT head unremarkable.  CTA no LVO, some fluid collection in the palate teen tonsil.  Patient denies sore throat or any ENT symptoms.  Will add on group A strep swab.  Patient was admitted under Dr. Graves to the inpatient service for further management. See below for details of ED course and ultimate disposition.    Medications   iohexol (OMNIPaque) 350 mg iodine/mL solution 75 mL (75 mL intravenous Given 4/2/24 1724)        Diagnoses as of 04/02/24 1906   TIA (transient ischemic attack)       IV Thrombolysis:    No Thrombolysis contraindication reason: Neurologic signs have spontaneously resolved  and Time from Last Known Well (or stroke onset) is >4.5 hours     Disposition:   Admitted to [facility] team, discussed differential and findings with patient as well as any family members at bedside.      ED Prescriptions    None         Wen Padgett MD  EM/IM/Peds    This note was dictated by speech recognition. Minor errors in transcription may be present.     Wen Padgett MD  05/03/24 2027

## 2024-04-02 NOTE — ED TRIAGE NOTES
"Patient presents to ED with shortness of breath, patient states that \"[facility] and started having shortness of breath, I haven't changed my diet I haven't changed anything\" patient endorsing cp under her right breast that started yesterday.     Patient brought her medications with her from her new pharmacy:  Atorvastatin   Metoprolol  Amlodipine    These medications are not new to pt, \"I've been taking them for years, the pharmacy is new\"   Patient appears anxious, this RN able to guide patient to slow her breathing. 100% on RA.   "

## 2024-04-02 NOTE — ED TRIAGE NOTES
TRIAGE NOTE   I saw the patient as the Clinician in Triage and performed a brief history and physical exam, established acuity, and ordered appropriate tests to develop basic plan of care. Patient will be seen by an [facility], resident and/or physician who will independently evaluate the patient. Please see subsequent provider notes for further details and disposition.     Brief HPI: In brief, Alexandra Love is a 78 y.o. female that presents for this of breath.  Patient states she has shortness of breath, palpitations, and tingling in her left upper extremity and underneath her left breast.  States that she feels short of breath.  Denies any recent travel or surgeries.  Denies history of PE or DVT.  She states that she had similar symptoms 1 week ago that resolved.  She states she got medications from a new pharmacy.  Denies fever/chills, nausea/vomiting.  Denies any neck, arm, or jaw pain.  She says that the tingling in her left hand has essentially resolved.  No other symptoms or concerns at this time.    Focused Physical exam:   Heart regular rate and rhythm.  No murmurs or gallops.  Lungs clear to auscultation bilaterally.  No rhonchi wheezing or rales.  5/5 strength in upper and lower extremities bilaterally.  Sensation intact in upper and lower extremities bilaterally.  2+ symmetric radial pulses.    Plan/MDM:   Initiating CBC, CMP, troponins, BNP, magnesium, chest x-ray, EKG.  Patient will be seen in the back to the ED for further evaluation and treatment.  I will not be following this patient during her ED visit.  This is just a preliminary evaluation during triage.    Please see subsequent provider note for further details and disposition.  As provider-in-triage, I performed a medical screening history and physical exam on this patient. For the remainder of the patient's workup and ED course, please see the main ED provider note.  I evaluated this patient in triage with the RN. Due to the patient's complaint,  labs, imaging, and/or interventions were ordered by me in an attempt to expedite/facilitate patient care, however I am not participating in care after evaluation. This is a preliminary assessment. Patient does not appear in acute distress at this time. They are stable and will have a full evaluation as soon as possible. They will be cared for by another provider who will possibly order more labs, imaging and/or interventions. Patient did not have a full ROS or PE completed by myself, however below is a summary with reasons for orders.  Patient to be reevaluated once in formal ED bed.

## 2024-04-03 ENCOUNTER — APPOINTMENT (OUTPATIENT)
Dept: CARDIOLOGY | Facility: HOSPITAL | Age: 79
DRG: 069 | End: 2024-04-03
Payer: MEDICARE

## 2024-04-03 ENCOUNTER — APPOINTMENT (OUTPATIENT)
Dept: RADIOLOGY | Facility: HOSPITAL | Age: 79
DRG: 069 | End: 2024-04-03
Payer: MEDICARE

## 2024-04-03 LAB
ANION GAP SERPL CALC-SCNC: 11 MMOL/L (ref 10–20)
ANION GAP SERPL CALC-SCNC: 11 MMOL/L (ref 10–20)
AORTIC VALVE MEAN GRADIENT: 3 MMHG
AORTIC VALVE PEAK VELOCITY: 1.15 M/S
APPEARANCE UR: CLEAR
AV PEAK GRADIENT: 5.3 MMHG
AVA (PEAK VEL): 1.36 CM2
AVA (VTI): 1.38 CM2
BACTERIA #/AREA URNS AUTO: ABNORMAL /HPF
BILIRUB UR STRIP.AUTO-MCNC: NEGATIVE MG/DL
BUN SERPL-MCNC: 8 MG/DL (ref 6–23)
BUN SERPL-MCNC: 8 MG/DL (ref 6–23)
CA-I BLD-SCNC: >2.5 MMOL/L (ref 1.1–1.33)
CALCIUM SERPL-MCNC: 6.6 MG/DL (ref 8.6–10.3)
CALCIUM SERPL-MCNC: 7.8 MG/DL (ref 8.6–10.3)
CHLORIDE SERPL-SCNC: 111 MMOL/L (ref 98–107)
CHLORIDE SERPL-SCNC: 116 MMOL/L (ref 98–107)
CO2 SERPL-SCNC: 17 MMOL/L (ref 21–32)
CO2 SERPL-SCNC: 20 MMOL/L (ref 21–32)
COLOR UR: COLORLESS
CREAT SERPL-MCNC: 0.51 MG/DL (ref 0.5–1.05)
CREAT SERPL-MCNC: 0.55 MG/DL (ref 0.5–1.05)
EGFRCR SERPLBLD CKD-EPI 2021: >90 ML/MIN/1.73M*2
EGFRCR SERPLBLD CKD-EPI 2021: >90 ML/MIN/1.73M*2
EJECTION FRACTION APICAL 4 CHAMBER: 67.5
ERYTHROCYTE [DISTWIDTH] IN BLOOD BY AUTOMATED COUNT: 14.9 % (ref 11.5–14.5)
EST. AVERAGE GLUCOSE BLD GHB EST-MCNC: 111 MG/DL
GLUCOSE SERPL-MCNC: 106 MG/DL (ref 74–99)
GLUCOSE SERPL-MCNC: 113 MG/DL (ref 74–99)
GLUCOSE UR STRIP.AUTO-MCNC: NORMAL MG/DL
HBA1C MFR BLD: 5.5 %
HCT VFR BLD AUTO: 39.4 % (ref 36–46)
HGB BLD-MCNC: 11.7 G/DL (ref 12–16)
KETONES UR STRIP.AUTO-MCNC: ABNORMAL MG/DL
LEFT ATRIUM VOLUME AREA LENGTH INDEX BSA: 32 ML/M2
LEFT VENTRICLE INTERNAL DIMENSION DIASTOLE: 3.08 CM (ref 3.5–6)
LEFT VENTRICULAR OUTFLOW TRACT DIAMETER: 2.06 CM
LEUKOCYTE ESTERASE UR QL STRIP.AUTO: ABNORMAL
LV EJECTION FRACTION BIPLANE: 66 %
MAGNESIUM SERPL-MCNC: 1.8 MG/DL (ref 1.6–2.4)
MCH RBC QN AUTO: 25.9 PG (ref 26–34)
MCHC RBC AUTO-ENTMCNC: 29.7 G/DL (ref 32–36)
MCV RBC AUTO: 87 FL (ref 80–100)
MITRAL VALVE E/A RATIO: 0.57
NITRITE UR QL STRIP.AUTO: NEGATIVE
NRBC BLD-RTO: 0 /100 WBCS (ref 0–0)
PH UR STRIP.AUTO: 8 [PH]
PHOSPHATE SERPL-MCNC: 2.8 MG/DL (ref 2.5–4.9)
PLATELET # BLD AUTO: 177 X10*3/UL (ref 150–450)
POTASSIUM SERPL-SCNC: 2.6 MMOL/L (ref 3.5–5.3)
POTASSIUM SERPL-SCNC: 3.7 MMOL/L (ref 3.5–5.3)
POTASSIUM SERPL-SCNC: 6.5 MMOL/L (ref 3.5–5.3)
PROT UR STRIP.AUTO-MCNC: NEGATIVE MG/DL
RBC # BLD AUTO: 4.52 X10*6/UL (ref 4–5.2)
RBC # UR STRIP.AUTO: NEGATIVE /UL
RBC #/AREA URNS AUTO: ABNORMAL /HPF
RIGHT VENTRICLE FREE WALL PEAK S': 12.6 CM/S
SODIUM SERPL-SCNC: 137 MMOL/L (ref 136–145)
SODIUM SERPL-SCNC: 139 MMOL/L (ref 136–145)
SP GR UR STRIP.AUTO: 1.03
TRICUSPID ANNULAR PLANE SYSTOLIC EXCURSION: 1.6 CM
TSH SERPL-ACNC: 0.97 MIU/L (ref 0.44–3.98)
UROBILINOGEN UR STRIP.AUTO-MCNC: NORMAL MG/DL
WBC # BLD AUTO: 7.5 X10*3/UL (ref 4.4–11.3)
WBC #/AREA URNS AUTO: ABNORMAL /HPF

## 2024-04-03 PROCEDURE — 93005 ELECTROCARDIOGRAM TRACING: CPT

## 2024-04-03 PROCEDURE — 80048 BASIC METABOLIC PNL TOTAL CA: CPT | Performed by: NURSE PRACTITIONER

## 2024-04-03 PROCEDURE — 2500000004 HC RX 250 GENERAL PHARMACY W/ HCPCS (ALT 636 FOR OP/ED): Performed by: NURSE PRACTITIONER

## 2024-04-03 PROCEDURE — 70551 MRI BRAIN STEM W/O DYE: CPT

## 2024-04-03 PROCEDURE — 2500000002 HC RX 250 W HCPCS SELF ADMINISTERED DRUGS (ALT 637 FOR MEDICARE OP, ALT 636 FOR OP/ED): Performed by: NURSE PRACTITIONER

## 2024-04-03 PROCEDURE — 2500000002 HC RX 250 W HCPCS SELF ADMINISTERED DRUGS (ALT 637 FOR MEDICARE OP, ALT 636 FOR OP/ED): Performed by: INTERNAL MEDICINE

## 2024-04-03 PROCEDURE — 93306 TTE W/DOPPLER COMPLETE: CPT | Performed by: INTERNAL MEDICINE

## 2024-04-03 PROCEDURE — 83735 ASSAY OF MAGNESIUM: CPT | Performed by: NURSE PRACTITIONER

## 2024-04-03 PROCEDURE — 36415 COLL VENOUS BLD VENIPUNCTURE: CPT | Performed by: NURSE PRACTITIONER

## 2024-04-03 PROCEDURE — 2500000001 HC RX 250 WO HCPCS SELF ADMINISTERED DRUGS (ALT 637 FOR MEDICARE OP): Performed by: INTERNAL MEDICINE

## 2024-04-03 PROCEDURE — 84132 ASSAY OF SERUM POTASSIUM: CPT | Performed by: NURSE PRACTITIONER

## 2024-04-03 PROCEDURE — 81001 URINALYSIS AUTO W/SCOPE: CPT | Performed by: INTERNAL MEDICINE

## 2024-04-03 PROCEDURE — 2500000004 HC RX 250 GENERAL PHARMACY W/ HCPCS (ALT 636 FOR OP/ED): Performed by: INTERNAL MEDICINE

## 2024-04-03 PROCEDURE — 36415 COLL VENOUS BLD VENIPUNCTURE: CPT | Performed by: INTERNAL MEDICINE

## 2024-04-03 PROCEDURE — 99222 1ST HOSP IP/OBS MODERATE 55: CPT | Performed by: INTERNAL MEDICINE

## 2024-04-03 PROCEDURE — 82330 ASSAY OF CALCIUM: CPT | Performed by: INTERNAL MEDICINE

## 2024-04-03 PROCEDURE — 99222 1ST HOSP IP/OBS MODERATE 55: CPT | Performed by: STUDENT IN AN ORGANIZED HEALTH CARE EDUCATION/TRAINING PROGRAM

## 2024-04-03 PROCEDURE — 1200000002 HC GENERAL ROOM WITH TELEMETRY DAILY

## 2024-04-03 PROCEDURE — 93306 TTE W/DOPPLER COMPLETE: CPT

## 2024-04-03 PROCEDURE — 70551 MRI BRAIN STEM W/O DYE: CPT | Performed by: RADIOLOGY

## 2024-04-03 PROCEDURE — 84100 ASSAY OF PHOSPHORUS: CPT | Performed by: NURSE PRACTITIONER

## 2024-04-03 PROCEDURE — 85027 COMPLETE CBC AUTOMATED: CPT | Performed by: NURSE PRACTITIONER

## 2024-04-03 PROCEDURE — 70544 MR ANGIOGRAPHY HEAD W/O DYE: CPT

## 2024-04-03 PROCEDURE — 70544 MR ANGIOGRAPHY HEAD W/O DYE: CPT | Performed by: RADIOLOGY

## 2024-04-03 RX ORDER — VALSARTAN 160 MG/1
160 TABLET ORAL 2 TIMES DAILY
Status: DISCONTINUED | OUTPATIENT
Start: 2024-04-03 | End: 2024-04-04 | Stop reason: HOSPADM

## 2024-04-03 RX ORDER — GUAIFENESIN 600 MG/1
600 TABLET, EXTENDED RELEASE ORAL EVERY 12 HOURS PRN
Status: DISCONTINUED | OUTPATIENT
Start: 2024-04-03 | End: 2024-04-04 | Stop reason: HOSPADM

## 2024-04-03 RX ORDER — PANTOPRAZOLE SODIUM 40 MG/10ML
40 INJECTION, POWDER, LYOPHILIZED, FOR SOLUTION INTRAVENOUS
Status: DISCONTINUED | OUTPATIENT
Start: 2024-04-04 | End: 2024-04-04 | Stop reason: HOSPADM

## 2024-04-03 RX ORDER — POTASSIUM CHLORIDE 14.9 MG/ML
20 INJECTION INTRAVENOUS
Status: COMPLETED | OUTPATIENT
Start: 2024-04-03 | End: 2024-04-03

## 2024-04-03 RX ORDER — ACETAMINOPHEN 650 MG/1
650 SUPPOSITORY RECTAL EVERY 4 HOURS PRN
Status: DISCONTINUED | OUTPATIENT
Start: 2024-04-03 | End: 2024-04-04 | Stop reason: HOSPADM

## 2024-04-03 RX ORDER — ENOXAPARIN SODIUM 100 MG/ML
40 INJECTION SUBCUTANEOUS EVERY 24 HOURS
Status: DISCONTINUED | OUTPATIENT
Start: 2024-04-03 | End: 2024-04-04 | Stop reason: HOSPADM

## 2024-04-03 RX ORDER — CLOPIDOGREL BISULFATE 75 MG/1
75 TABLET ORAL DAILY
Status: DISCONTINUED | OUTPATIENT
Start: 2024-04-03 | End: 2024-04-04 | Stop reason: HOSPADM

## 2024-04-03 RX ORDER — EZETIMIBE 10 MG/1
10 TABLET ORAL DAILY
Status: DISCONTINUED | OUTPATIENT
Start: 2024-04-03 | End: 2024-04-04 | Stop reason: HOSPADM

## 2024-04-03 RX ORDER — LATANOPROST 50 UG/ML
1 SOLUTION/ DROPS OPHTHALMIC NIGHTLY
Status: DISCONTINUED | OUTPATIENT
Start: 2024-04-03 | End: 2024-04-04 | Stop reason: HOSPADM

## 2024-04-03 RX ORDER — POTASSIUM CHLORIDE 20 MEQ/1
40 TABLET, EXTENDED RELEASE ORAL ONCE
Status: COMPLETED | OUTPATIENT
Start: 2024-04-03 | End: 2024-04-03

## 2024-04-03 RX ORDER — ALBUTEROL SULFATE 90 UG/1
1-2 AEROSOL, METERED RESPIRATORY (INHALATION) EVERY 6 HOURS PRN
Status: DISCONTINUED | OUTPATIENT
Start: 2024-04-03 | End: 2024-04-03 | Stop reason: CLARIF

## 2024-04-03 RX ORDER — ALBUTEROL SULFATE 0.83 MG/ML
2.5 SOLUTION RESPIRATORY (INHALATION) EVERY 6 HOURS PRN
Status: DISCONTINUED | OUTPATIENT
Start: 2024-04-03 | End: 2024-04-04 | Stop reason: HOSPADM

## 2024-04-03 RX ORDER — ATORVASTATIN CALCIUM 80 MG/1
80 TABLET, FILM COATED ORAL NIGHTLY
Status: DISCONTINUED | OUTPATIENT
Start: 2024-04-03 | End: 2024-04-04 | Stop reason: HOSPADM

## 2024-04-03 RX ORDER — PANTOPRAZOLE SODIUM 40 MG/1
40 TABLET, DELAYED RELEASE ORAL
Status: DISCONTINUED | OUTPATIENT
Start: 2024-04-04 | End: 2024-04-04 | Stop reason: HOSPADM

## 2024-04-03 RX ORDER — ACETAMINOPHEN 325 MG/1
650 TABLET ORAL EVERY 4 HOURS PRN
Status: DISCONTINUED | OUTPATIENT
Start: 2024-04-03 | End: 2024-04-04 | Stop reason: HOSPADM

## 2024-04-03 RX ORDER — ONDANSETRON 4 MG/1
4 TABLET, FILM COATED ORAL EVERY 8 HOURS PRN
Status: DISCONTINUED | OUTPATIENT
Start: 2024-04-03 | End: 2024-04-04 | Stop reason: HOSPADM

## 2024-04-03 RX ORDER — AMLODIPINE BESYLATE 5 MG/1
5 TABLET ORAL DAILY
Status: DISCONTINUED | OUTPATIENT
Start: 2024-04-03 | End: 2024-04-04 | Stop reason: HOSPADM

## 2024-04-03 RX ORDER — ACETAMINOPHEN 160 MG/5ML
650 SOLUTION ORAL EVERY 4 HOURS PRN
Status: DISCONTINUED | OUTPATIENT
Start: 2024-04-03 | End: 2024-04-04 | Stop reason: HOSPADM

## 2024-04-03 RX ORDER — ONDANSETRON HYDROCHLORIDE 2 MG/ML
4 INJECTION, SOLUTION INTRAVENOUS EVERY 8 HOURS PRN
Status: DISCONTINUED | OUTPATIENT
Start: 2024-04-03 | End: 2024-04-04 | Stop reason: HOSPADM

## 2024-04-03 RX ORDER — ASPIRIN 81 MG/1
81 TABLET ORAL DAILY
Status: DISCONTINUED | OUTPATIENT
Start: 2024-04-03 | End: 2024-04-04 | Stop reason: HOSPADM

## 2024-04-03 RX ORDER — METOPROLOL TARTRATE 50 MG/1
50 TABLET ORAL 2 TIMES DAILY
Status: DISCONTINUED | OUTPATIENT
Start: 2024-04-03 | End: 2024-04-04

## 2024-04-03 RX ORDER — TALC
3 POWDER (GRAM) TOPICAL NIGHTLY PRN
Status: DISCONTINUED | OUTPATIENT
Start: 2024-04-03 | End: 2024-04-04 | Stop reason: HOSPADM

## 2024-04-03 RX ORDER — POLYETHYLENE GLYCOL 3350 17 G/17G
17 POWDER, FOR SOLUTION ORAL DAILY PRN
Status: DISCONTINUED | OUTPATIENT
Start: 2024-04-03 | End: 2024-04-04 | Stop reason: HOSPADM

## 2024-04-03 RX ADMIN — ACETAMINOPHEN 650 MG: 325 TABLET ORAL at 07:54

## 2024-04-03 RX ADMIN — EZETIMIBE 10 MG: 10 TABLET ORAL at 09:52

## 2024-04-03 RX ADMIN — PERFLUTREN 2 ML OF DILUTION: 6.52 INJECTION, SUSPENSION INTRAVENOUS at 17:20

## 2024-04-03 RX ADMIN — VALSARTAN 160 MG: 160 TABLET, FILM COATED ORAL at 10:26

## 2024-04-03 RX ADMIN — CLOPIDOGREL 75 MG: 75 TABLET ORAL at 09:52

## 2024-04-03 RX ADMIN — POTASSIUM CHLORIDE 20 MEQ: 200 INJECTION, SOLUTION INTRAVENOUS at 11:58

## 2024-04-03 RX ADMIN — ASPIRIN 81 MG 81 MG: 81 TABLET ORAL at 08:41

## 2024-04-03 RX ADMIN — CALCIUM GLUCONATE 3 G: 98 INJECTION, SOLUTION INTRAVENOUS at 11:57

## 2024-04-03 RX ADMIN — POTASSIUM CHLORIDE 20 MEQ: 200 INJECTION, SOLUTION INTRAVENOUS at 14:23

## 2024-04-03 RX ADMIN — ATORVASTATIN CALCIUM 80 MG: 80 TABLET, FILM COATED ORAL at 21:39

## 2024-04-03 RX ADMIN — ENOXAPARIN SODIUM 40 MG: 40 INJECTION SUBCUTANEOUS at 09:52

## 2024-04-03 RX ADMIN — POTASSIUM CHLORIDE 40 MEQ: 1500 TABLET, EXTENDED RELEASE ORAL at 11:08

## 2024-04-03 RX ADMIN — AMLODIPINE BESYLATE 5 MG: 5 TABLET ORAL at 09:51

## 2024-04-03 RX ADMIN — METOPROLOL TARTRATE 50 MG: 25 TABLET, FILM COATED ORAL at 09:52

## 2024-04-03 SDOH — SOCIAL STABILITY: SOCIAL INSECURITY: WERE YOU ABLE TO COMPLETE ALL THE BEHAVIORAL HEALTH SCREENINGS?: YES

## 2024-04-03 SDOH — SOCIAL STABILITY: SOCIAL INSECURITY: HAVE YOU HAD THOUGHTS OF HARMING ANYONE ELSE?: NO

## 2024-04-03 ASSESSMENT — PATIENT HEALTH QUESTIONNAIRE - PHQ9
SUM OF ALL RESPONSES TO PHQ9 QUESTIONS 1 & 2: 0
2. FEELING DOWN, DEPRESSED OR HOPELESS: NOT AT ALL
1. LITTLE INTEREST OR PLEASURE IN DOING THINGS: NOT AT ALL

## 2024-04-03 ASSESSMENT — COGNITIVE AND FUNCTIONAL STATUS - GENERAL
MOBILITY SCORE: 24
PATIENT BASELINE BEDBOUND: NO
DAILY ACTIVITIY SCORE: 24

## 2024-04-03 ASSESSMENT — ACTIVITIES OF DAILY LIVING (ADL)
PATIENT'S MEMORY ADEQUATE TO SAFELY COMPLETE DAILY ACTIVITIES?: YES
HEARING - LEFT EAR: FUNCTIONAL
DRESSING YOURSELF: INDEPENDENT
ADEQUATE_TO_COMPLETE_ADL: YES
JUDGMENT_ADEQUATE_SAFELY_COMPLETE_DAILY_ACTIVITIES: YES
TOILETING: INDEPENDENT
BATHING: INDEPENDENT
FEEDING YOURSELF: INDEPENDENT
HEARING - RIGHT EAR: FUNCTIONAL
WALKS IN HOME: INDEPENDENT
LACK_OF_TRANSPORTATION: NO
GROOMING: INDEPENDENT

## 2024-04-03 ASSESSMENT — PAIN - FUNCTIONAL ASSESSMENT
PAIN_FUNCTIONAL_ASSESSMENT: 0-10

## 2024-04-03 ASSESSMENT — PAIN SCALES - GENERAL
PAINLEVEL_OUTOF10: 0 - NO PAIN
PAINLEVEL_OUTOF10: 1
PAINLEVEL_OUTOF10: 0 - NO PAIN
PAINLEVEL_OUTOF10: 3
PAINLEVEL_OUTOF10: 0 - NO PAIN

## 2024-04-03 ASSESSMENT — LIFESTYLE VARIABLES
HOW MANY STANDARD DRINKS CONTAINING ALCOHOL DO YOU HAVE ON A TYPICAL DAY: PATIENT DOES NOT DRINK
SKIP TO QUESTIONS 9-10: 1
HOW OFTEN DO YOU HAVE A DRINK CONTAINING ALCOHOL: NEVER
AUDIT-C TOTAL SCORE: 0
HOW OFTEN DO YOU HAVE 6 OR MORE DRINKS ON ONE OCCASION: NEVER
AUDIT-C TOTAL SCORE: 0

## 2024-04-03 ASSESSMENT — ENCOUNTER SYMPTOMS: SHORTNESS OF BREATH: 1

## 2024-04-03 ASSESSMENT — PAIN DESCRIPTION - LOCATION
LOCATION: HEAD
LOCATION: HEAD

## 2024-04-03 ASSESSMENT — PAIN DESCRIPTION - PROGRESSION: CLINICAL_PROGRESSION: GRADUALLY IMPROVING

## 2024-04-03 NOTE — PROGRESS NOTES
Alexandra Love is a 78 y.o. female on day 1 of admission presenting with TIA (transient ischemic attack).     04/03/24 1227   Discharge Planning   Living Arrangements Spouse/significant other   Support Systems Spouse/significant other;Children   Assistance Needed ind with ADLs and IADLs; drives. In  ED it was reported that pt was non-sensical at times but with this writer she was A+Ox4 and dtr confirmed she  was  at mental status  baseline   Type of Residence Private residence   Number of Stairs to Enter Residence 2   Number of Stairs Within Residence 0  (first  floor  setup)   Home or Post Acute Services None   Patient expects to be discharged to: home   Does the patient need discharge transport arranged? No   Financial Resource Strain   How hard is it for you to pay for the very basics like food, housing, medical care, and heating? Not hard   Housing Stability   In the last 12 months, was there a time when you were not able to pay the mortgage or rent on time? N   In the last 12 months, how many places have you lived? 1   In the last 12 months, was there a time when you did not have a steady place to sleep or slept in a shelter (including now)? N   Transportation Needs   In the past 12 months, has lack of transportation kept you from medical appointments or from getting medications? no   In the past 12 months, has lack of transportation kept you from meetings, work, or from getting things needed for daily living? No     Pt presented to ED with aphasia  and  chest  pain. Per  dtr  at bedside, aphasia  has  improved and pt is  back to her  A+Ox4  baseline. Pt lives at home (has one story set up)  with  her  spouse and drives, does not use  DME. She is ind with ADLs and IADLs. Pt has  PCP [facility].     Family will drive  pt  home - dtr at bedside lives in [place]  but said she is in [place]  frequently  to  see her parents.     SW  also  talked to family about  HPOA  paperwork -  paperwork  given for them to complete  if  desired.     ADOD is  two  days.          HONORIO Cerrato

## 2024-04-03 NOTE — H&P
Alexandra Love is a 78 y.o. female   Shortness of Breath       Patient with a past medical history of hypertension dyslipidemia history of palpitations for which she follows with cardiology was doing well until a couple of weeks ago when apparently her medications were changed from brand name to generic  Soon after starting the patient generic medications she started feeling more short of breath  And started having more palpitations  As result she stopped her medications  Over the last couple days she was feeling lightheaded and yesterday the patient's daughter noticed that she was having slurred speech and confusion and brought to the emergency room  Stroke workup was initiated in the ER and CT angio was negative  This morning on my exam the patient was feeling better denies any headaches chest pain or palpitations currently  Speech has returned to normal    Past Medical History  Past Medical History:   Diagnosis Date    Colon polyp     Delayed emergence from general anesthesia     Dysuria 2016    Burning with urination    Encounter for examination of blood pressure without abnormal findings 2015    Blood pressure check    Encounter for examination of blood pressure without abnormal findings 2015    Blood pressure check    Hyperlipidemia     Hypertension     Other conditions influencing health status 2015    Follow-up arranged    Personal history of colonic polyps 2018    History of colon polyps    Personal history of other diseases of the circulatory system 2018    History of hypertension    Personal history of other diseases of the circulatory system 2016    History of hypertension    Rash and other nonspecific skin eruption 2022    Rash    Unspecified abdominal pain 2018    Abdominal pain       Surgical History  Past Surgical History:   Procedure Laterality Date     SECTION, LOW TRANSVERSE      TOTAL ABDOMINAL HYSTERECTOMY W/ BILATERAL  SALPINGOOPHORECTOMY  06/24/2014    Total Abdominal Hysterectomy With Removal Of Both Ovaries        Social History  She reports that she has never smoked. She has never used smokeless tobacco. She reports that she does not currently use alcohol. She reports that she does not currently use drugs.    Family History  Family History   Problem Relation Name Age of Onset    Ovarian cancer Mother      Cancer Father          gastric cancer    Colon cancer Sister      Diabetes Other      Breast cancer Neg Hx          Allergies  Other, Sulfa (sulfonamide antibiotics), Sulfamethoxazole, Sulfonylureas, and Thiazides    Review of Systems   Respiratory:  Positive for shortness of breath.         Constitutional: not feeling poorly, no fever, no recent weight gain and no recent weight loss.   Eyes: no blurred vision and no diplopia.   ENT: no hearing loss, no tinnitus, no earache, no sore throat, no hoarseness and no swollen glands in the neck.   Cardiovascular: no chest pain, no tightness or heavy pressure, no lower extremity edema.   Respiratory: no cough, wheezing   Gastrointestinal: no change in bowel habits, no diarrhea, no constipation, no bloody stools, no nausea, no vomiting, no abdominal pain, no signs and symptoms of ulcer disease, no diana colored stools and no intolerance to fatty foods.   Genitourinary: no urinary frequency, no dysuria, no hematuria, no burning sensation during urination, urinary stream is not smaller and urinary stream does not start and stop.   Musculoskeletal: no arthralgias, no joint stiffness, no muscle weakness, no back pain and no difficulty walking.   Skin: no rashes, no change in skin color and pigmentation, no skin lesions and no skin lumps.   Neurological: no headaches, no dizziness, no seizures, no tingling, no numbness, no signs and symptoms of stroke and no limb weakness.   Psychiatric: no confusion, no memory lapses or loss, no depression and no sleep disturbances.   Endocrine: no goiter,  no thyroid disorder, no diabetes mellitus, no excessive thirst, no dry skin, no cold intolerance, no heat intolerance and no increased urinary frequency.   Hematologic/Lymphatic: is not slow to heal, does not bleed easily, does not bruise easily, no thrombophlebitis, no anemia and no history of blood transfusion.   All other systems have been reviewed and are negative for complaint.     Vitals:    04/03/24 1230   BP: (!) 110/94   Pulse: 54   Resp: 20   Temp:    SpO2: 96%        Scheduled medications  [Held by provider] amLODIPine, 5 mg, oral, Daily  aspirin, 81 mg, oral, Daily  atorvastatin, 80 mg, oral, Nightly  calcium gluconate, 3 g, intravenous, Once  clopidogrel, 75 mg, oral, Daily  enoxaparin, 40 mg, subcutaneous, q24h  ezetimibe, 10 mg, oral, Daily  latanoprost, 1 drop, Both Eyes, Nightly  [Held by provider] metoprolol tartrate, 50 mg, oral, BID  [START ON 4/4/2024] pantoprazole, 40 mg, oral, Daily before breakfast   Or  [START ON 4/4/2024] pantoprazole, 40 mg, intravenous, Daily before breakfast  potassium chloride, 20 mEq, intravenous, q2h  [Held by provider] valsartan, 160 mg, oral, BID      Continuous medications     PRN medications  PRN medications: acetaminophen **OR** acetaminophen **OR** acetaminophen, albuterol, alum-mag hydroxide-simeth, guaiFENesin, ipratropium-albuteroL, melatonin, ondansetron **OR** ondansetron, polyethylene glycol, traMADol    Results from last 7 days   Lab Units 04/03/24  0846 04/02/24  1234   WBC AUTO x10*3/uL 7.5 8.1   HEMOGLOBIN g/dL 11.7* 15.3   HEMATOCRIT % 39.4 46.2*   PLATELETS AUTO x10*3/uL 177 344     Results from last 7 days   Lab Units 04/03/24  0846 04/02/24  1234   SODIUM mmol/L 139 138   POTASSIUM mmol/L 2.6* 3.9   CHLORIDE mmol/L 111* 101   CO2 mmol/L 20* 19*   BUN mg/dL 8 11   CREATININE mg/dL 0.55 0.75   CALCIUM mg/dL 6.6* 10.1   PROTEIN TOTAL g/dL  --  8.3*   BILIRUBIN TOTAL mg/dL  --  1.3*   ALK PHOS U/L  --  50   ALT U/L  --  18   AST U/L  --  24   GLUCOSE  mg/dL 106* 119*     Results from last 7 days   Lab Units 04/02/24  1741 04/02/24  1234   TROPHS ng/L 9 5        MR brain wo IV contrast   Final Result   No evidence of acute infarct, intracranial mass effect or midline   shift.        Mild nonspecific white matter signal compatible with microangiopathy.        MACRO:   None        Signed by: Jess Stout 4/3/2024 12:13 PM   Dictation workstation:   [id number]      MR angio head wo IV contrast   Final Result   1.  There is no evidence for hemodynamically significant stenosis or   large branch vessel cutoffs of the visualized intracranial   vasculature.        MACRO:   None        Signed by: Jess Stout 4/3/2024 12:15 PM   Dictation workstation:   [id number]      CT angio brain attack neck w IV contrast and post procedure   Final Result   CTA neck:        No evidence for significant stenosis of the cervical vessels.        There is prominence of the palatine tonsils bilaterally. There is a   fluid collection within the right palatine tonsil measuring 1.4 x 1.0   cm.        Multiple subcentimeter nodules within the thyroid gland.        CTA head:        No evidence for significant stenosis or large branch vessel cutoffs   of the intracranial vessels.        MACRO:   None        Signed by: Jess Stout 4/2/2024 5:38 PM   Dictation workstation:   [id number]      CT brain attack head wo IV contrast   Final Result   No evidence of acute cortical infarct or intracranial hemorrhage.             MACRO:   Liam Sanders discussed the significance and urgency of this   critical finding by secure chat with  NENO DENG on 4/2/2024 at   5:34 pm.  (**-RCF-**) Findings:  See findings.             Signed by: Liam Sanders 4/2/2024 5:34 PM   Dictation workstation:   [id number]      XR chest 2 views   Final Result   No acute cardiopulmonary process.        MACRO:   None        Signed by: Beverly Wright 4/2/2024 1:03 PM   Dictation workstation:   [id number]      CT angio  brain attack head w IV contrast and post procedure    (Results Pending)       Physical Exam     Constitutional   General appearance: Alert and in no acute distress.   Eyes   Inspection of eyes: Sclera and conjunctiva were normal.    Pupil exam: Pupils were equal in size. Extraocular movements were intact.   Pulmonary   Respiratory assessment: No respiratory distress, normal respiratory rhythm and effort.    Auscultation of Lungs: Clear bilateral breath sounds.   Cardiovascular   Auscultation of heart: Apical pulse normal, heart rate and rhythm normal, normal S1 and S2, no murmurs and no pericardial rub.    Exam for edema: No peripheral edema.   Abdomen   Abdominal Exam: No bruits, normal bowel sounds, soft, non-tender, no abdominal mass palpated.    Liver and Spleen exam: No hepato-splenomegaly.   Musculoskeletal   Examination of gait: Normal.    Inspection of digits and nails: No clubbing or cyanosis of the fingernails.    Inspection/palpation of joints, bones and muscles: No joint swelling. Normal movement of all extremities.   Skin   Skin inspection: Normal skin color and pigmentation, normal skin turgor and no visible rash.   Neurologic   Cranial nerves: Nerves 2-12 were intact, no focal neuro defects.   Psychiatric   Orientation: Oriented to person, place, and time.    Mood and affect: Normal.       Assessment/Plan      #Slurred speech  Rule out TIA  CT head negative  We will check an MRI MRA  Patient was taking daily aspirin faithfully  Add Plavix  Neurology consult    #Dyslipidemia  Target LDL less than 70  And will increase atorvastatin to 80 mg    #Hypertension  #Hypokalemia  #Hypocalcemia  Replace  Check ionized calcium levels  Permissive hypertension for now

## 2024-04-03 NOTE — CONSULTS
Consults    History Of Present Illness  Alexandra Love is a 78 y.o. female presenting with dysarthria.  She has a history of vascular risk factors history of palpitation and shortness of breath yesterday she had episodes of palpitations and shortness of breath.  After that she came to ER for the same as she had some dysarthria.  Dysarthria subsequently resolved.  There is no aphasia or dysphagia endorsed.  Otherwise nonfocal.  Neurology was consulted for question whether she had a stroke or TIA.  MRI was done which is unremarkable..  She is already taking aspirin at home.  Past Medical History  Past Medical History:   Diagnosis Date    Colon polyp     Delayed emergence from general anesthesia     Dysuria 2016    Burning with urination    Encounter for examination of blood pressure without abnormal findings 2015    Blood pressure check    Encounter for examination of blood pressure without abnormal findings 2015    Blood pressure check    Hyperlipidemia     Hypertension     Other conditions influencing health status 2015    Follow-up arranged    Personal history of colonic polyps 2018    History of colon polyps    Personal history of other diseases of the circulatory system 2018    History of hypertension    Personal history of other diseases of the circulatory system 2016    History of hypertension    Rash and other nonspecific skin eruption 2022    Rash    Unspecified abdominal pain 2018    Abdominal pain     Surgical History  Past Surgical History:   Procedure Laterality Date     SECTION, LOW TRANSVERSE      TOTAL ABDOMINAL HYSTERECTOMY W/ BILATERAL SALPINGOOPHORECTOMY  2014    Total Abdominal Hysterectomy With Removal Of Both Ovaries     Social History  Social History     Tobacco Use    Smoking status: Never    Smokeless tobacco: Never   Vaping Use    Vaping Use: Never used   Substance Use Topics    Alcohol use: Not Currently    Drug use: Not  "Currently     Allergies  Other, Sulfa (sulfonamide antibiotics), Sulfamethoxazole, Sulfonylureas, and Thiazides  Medications Prior to Admission   Medication Sig Dispense Refill Last Dose    albuterol 90 mcg/actuation inhaler Inhale 1-2 puffs every 6 hours if needed.       amLODIPine (Norvasc) 5 mg tablet Take 1 tablet (5 mg) by mouth once daily. 90 tablet 1     ascorbic acid, vitamin C, 500 mg capsule Take 1 capsule by mouth once daily.       aspirin 81 mg EC tablet Take 1 tablet (81 mg) by mouth once daily.       atorvastatin (Lipitor) 40 mg tablet Take 1 tablet (40 mg) by mouth once daily at bedtime. 90 tablet 1     ergocalciferol, vitamin D2, (CALCIFEROL ORAL) Vitamin D 1000 units caps---take as directed       ezetimibe (Zetia) 10 mg tablet Take 1 tablet (10 mg) by mouth once daily. 90 tablet 1     latanoprost (Xalatan) 0.005 % ophthalmic solution Administer 1 drop into both eyes once daily at bedtime. As directed       metoprolol tartrate (Lopressor) 50 mg tablet Take 1 tablet by mouth 2 times a day. 180 tablet 1     valsartan (Diovan) 160 mg tablet Take 1 tablet (160 mg) by mouth 2 times a day. 180 tablet 1        Review of Systems as noted in HPI.  Neurological Exam  Alert oriented x 4, extraocular full, saccades normal, VOR normal normal facial.  No face is symmetric.  No abnormal involuntary forced movement.  Pupils reactive visual field full.  Power intact in all 4 extremities proximally distally.  Sensations intact in all 4 extremities proximally and distally.  No abnormal involuntary movement no dysphagia no dysarthria or aphasia.  Reflexes symmetric.  Physical Exam  Last Recorded Vitals  Blood pressure 139/74, pulse 66, temperature 36.7 °C (98.1 °F), temperature source Oral, resp. rate 16, height 1.575 m (5' 2\"), weight 80.3 kg (177 lb), SpO2 99 %.    Relevant Results  Scheduled medications  [Held by provider] amLODIPine, 5 mg, oral, Daily  aspirin, 81 mg, oral, Daily  atorvastatin, 80 mg, oral, " Nightly  clopidogrel, 75 mg, oral, Daily  enoxaparin, 40 mg, subcutaneous, q24h  ezetimibe, 10 mg, oral, Daily  latanoprost, 1 drop, Both Eyes, Nightly  [Held by provider] metoprolol tartrate, 50 mg, oral, BID  [START ON 4/4/2024] pantoprazole, 40 mg, oral, Daily before breakfast   Or  [START ON 4/4/2024] pantoprazole, 40 mg, intravenous, Daily before breakfast  [Held by provider] valsartan, 160 mg, oral, BID      Continuous medications     PRN medications  PRN medications: acetaminophen **OR** acetaminophen **OR** acetaminophen, albuterol, alum-mag hydroxide-simeth, guaiFENesin, ipratropium-albuteroL, melatonin, ondansetron **OR** ondansetron, polyethylene glycol, traMADol    Results for orders placed or performed during the hospital encounter of 04/02/24 (from the past 24 hour(s))   Lipid Panel   Result Value Ref Range    Cholesterol 199 0 - 199 mg/dL    HDL-Cholesterol 57.1 mg/dL    Cholesterol/HDL Ratio 3.5     LDL Calculated 122 (H) <=99 mg/dL    VLDL 20 0 - 40 mg/dL    Triglycerides 98 0 - 149 mg/dL    Non HDL Cholesterol 142 0 - 149 mg/dL   Hemoglobin A1c   Result Value Ref Range    Hemoglobin A1C 5.5 see below %    Estimated Average Glucose 111 Not Established mg/dL   TSH   Result Value Ref Range    Thyroid Stimulating Hormone 0.97 0.44 - 3.98 mIU/L   Group A Streptococcus, PCR    Specimen: Throat/Pharynx; Swab   Result Value Ref Range    Group A Strep PCR Not Detected Not Detected   CBC   Result Value Ref Range    WBC 7.5 4.4 - 11.3 x10*3/uL    nRBC 0.0 0.0 - 0.0 /100 WBCs    RBC 4.52 4.00 - 5.20 x10*6/uL    Hemoglobin 11.7 (L) 12.0 - 16.0 g/dL    Hematocrit 39.4 36.0 - 46.0 %    MCV 87 80 - 100 fL    MCH 25.9 (L) 26.0 - 34.0 pg    MCHC 29.7 (L) 32.0 - 36.0 g/dL    RDW 14.9 (H) 11.5 - 14.5 %    Platelets 177 150 - 450 x10*3/uL   Basic Metabolic Panel   Result Value Ref Range    Glucose 106 (H) 74 - 99 mg/dL    Sodium 139 136 - 145 mmol/L    Potassium 2.6 (LL) 3.5 - 5.3 mmol/L    Chloride 111 (H) 98 - 107  mmol/L    Bicarbonate 20 (L) 21 - 32 mmol/L    Anion Gap 11 10 - 20 mmol/L    Urea Nitrogen 8 6 - 23 mg/dL    Creatinine 0.55 0.50 - 1.05 mg/dL    eGFR >90 >60 mL/min/1.73m*2    Calcium 6.6 (L) 8.6 - 10.3 mg/dL   Magnesium   Result Value Ref Range    Magnesium 1.80 1.60 - 2.40 mg/dL   Phosphorus   Result Value Ref Range    Phosphorus 2.8 2.5 - 4.9 mg/dL   Urinalysis with Reflex Microscopic   Result Value Ref Range    Color, Urine Colorless (N) Light-Yellow, Yellow, Dark-Yellow    Appearance, Urine Clear Clear    Specific Gravity, Urine 1.032 1.005 - 1.035    pH, Urine 8.0 5.0, 5.5, 6.0, 6.5, 7.0, 7.5, 8.0    Protein, Urine NEGATIVE NEGATIVE, 10 (TRACE), 20 (TRACE) mg/dL    Glucose, Urine Normal Normal mg/dL    Blood, Urine NEGATIVE NEGATIVE    Ketones, Urine 10 (1+) (A) NEGATIVE mg/dL    Bilirubin, Urine NEGATIVE NEGATIVE    Urobilinogen, Urine Normal Normal mg/dL    Nitrite, Urine NEGATIVE NEGATIVE    Leukocyte Esterase, Urine 250 Ganesh/µL (A) NEGATIVE   Microscopic Only, Urine   Result Value Ref Range    WBC, Urine 1-5 1-5, NONE /HPF    RBC, Urine NONE NONE, 1-2, 3-5 /HPF    Bacteria, Urine 1+ (A) NONE SEEN /HPF   Calcium, ionized   Result Value Ref Range    POCT Calcium, Ionized >2.5 (HH) 1.1 - 1.33 mmol/L   Basic Metabolic Panel   Result Value Ref Range    Glucose 113 (H) 74 - 99 mg/dL    Sodium 137 136 - 145 mmol/L    Potassium 6.5 (HH) 3.5 - 5.3 mmol/L    Chloride 116 (H) 98 - 107 mmol/L    Bicarbonate 17 (L) 21 - 32 mmol/L    Anion Gap 11 10 - 20 mmol/L    Urea Nitrogen 8 6 - 23 mg/dL    Creatinine 0.51 0.50 - 1.05 mg/dL    eGFR >90 >60 mL/min/1.73m*2    Calcium 7.8 (L) 8.6 - 10.3 mg/dL   Potassium   Result Value Ref Range    Potassium 3.7 3.5 - 5.3 mmol/L   Transthoracic Echo (TTE) Complete   Result Value Ref Range    AV pk romaine 1.15 m/s    AV mn grad 3.0 mmHg    LVOT diam 2.06 cm    LV Biplane EF 66 %    MV E/A ratio 0.57     LA vol index A/L 32.0 ml/m2    Tricuspid annular plane systolic excursion 1.6 cm     RV free wall pk S' 12.60 cm/s    LVIDd 3.08 cm    Aortic Valve Area by Continuity of VTI 1.38 cm2    Aortic Valve Area by Continuity of Peak Velocity 1.36 cm2    AV pk grad 5.3 mmHg    LV A4C EF 67.5       NIH Stroke Scale  1A. Level of Consciousness: Alert, Keenly Responsive  1B. Ask Month and Age: Both Questions Right  1C. Blink Eyes & Squeeze Hands: Performs Both Tasks  2. Best Gaze: Normal  3. Visual: No Visual Loss  4. Facial Palsy: Normal Symmetrical Movements  5A. Motor - Left Arm: No Drift  5B. Motor - Right Arm: No Drift  6A. Motor - Left Leg: No Drift  6B. Motor - Right Leg: No Drift  7. Limb Ataxia: Absent  8. Sensory Loss: Normal  9. Best Language: No Aphasia  10. Dysarthria: Normal  11. Extinction and Inattention: No Abnormality  NIH Stroke Scale: 0           Bebe Coma Scale  Best Eye Response: Spontaneous  Best Verbal Response: Oriented  Best Motor Response: Follows commands  [place] Coma Scale Score: 15                 I have personally reviewed the following imaging results Transthoracic Echo (TTE) Complete    Result Date: 4/3/2024   [facility], [address] [place] [address]              Tel [phone] and Fax [phone] TRANSTHORACIC ECHOCARDIOGRAM REPORT  Patient Name:      DAYANA Betancur Physician:    [id number] Zheng Trevino MD Study Date:        4/3/2024             Ordering Provider:    [id number] DIONNE HANCOCK MRN/PID:           [id number]             Fellow: Accession#:        [id number]         Nurse: Date of Birth/Age: 1945 / 78      Sonographer:          Jaden Young RDCS                    years Gender:            F                    Additional Staff: Height:            157.48 cm            Admit Date:           4/2/2024 Weight:            80.29 kg             Admission Status:     Inpatient -                                                                Routine BSA / BMI:         1.81 m2 / 32.37      Encounter#:           [id number]                    kg/m2                                         Department Location:  [facility] ED Blood Pressure: 116 /66 mmHg Study Type:    TRANSTHORACIC ECHO (TTE) COMPLETE Diagnosis/ICD: Cerebral Infarction, unspecified-I63.9 Indication:    TIA CPT Code:      Echo Complete w Full Doppler-[id number] Patient History: Pertinent History: HTN, Palpitations and CVD. Study Detail: The following Echo studies were performed: 2D, M-Mode, Doppler and               color flow. Technically challenging study due to body habitus.               Agitated saline used as a contrast agent for intraseptal flow               evaluation and Definity used as a contrast agent for endocardial               border definition. Total contrast used for this procedure was 2 mL               via IV push.  PHYSICIAN INTERPRETATION: Left Ventricle: The left ventricular systolic function is normal, with an estimated ejection fraction of 70-75%. There are no regional wall motion abnormalities. The left ventricular cavity size is normal. Spectral Doppler shows an impaired relaxation pattern of left ventricular diastolic filling. Left Atrium: The left atrium is mildly dilated. Right Ventricle: The right ventricle was not well visualized. There is normal right ventricular global systolic function. Right Atrium: The right atrium is normal in size. Aortic Valve: The aortic valve is trileaflet. There is trace to mild aortic valve regurgitation. The peak instantaneous gradient of the aortic valve is 5.3 mmHg. The mean gradient of the aortic valve is 3.0 mmHg. Mitral Valve: The mitral valve is normal in structure. There is trace mitral valve regurgitation. Tricuspid Valve: The tricuspid valve is structurally normal. No evidence of tricuspid regurgitation. The right ventricular systolic pressure is unable to be estimated. Pulmonic Valve: The pulmonic  valve is structurally normal. There is no indication of pulmonic valve regurgitation. Pericardium: There is no pericardial effusion noted. Aorta: The aortic root is normal. There is mild dilatation of the ascending aorta. There is mobile plaque visualized in the ascending aorta, which is classified as a Grade 4 [severe atheroma >5 mm (no mobile/ulcerated components)] atherosclerosis. Systemic Veins: The inferior vena cava appears to be of normal size. In comparison to the previous echocardiogram(s): Compared with study from 11/28/2018, the plaque present in the ascending aorta was not visualized on the prior study.  CONCLUSIONS:  1. Left ventricular systolic function is normal with a 70-75% estimated ejection fraction.  2. Spectral Doppler shows an impaired relaxation pattern of left ventricular diastolic filling.  3. There is mobile plaque visualized in the ascending aorta, which is classified as a Grade 4 [severe atheroma >5 mm (no mobile/ulcerated components)] atherosclerosis. QUANTITATIVE DATA SUMMARY: 2D MEASUREMENTS:                          Normal Ranges: IVSd:          1.48 cm   (0.6-1.1cm) LVPWd:         1.16 cm   (0.6-1.1cm) LVIDd:         3.08 cm   (3.9-5.9cm) LVIDs:         2.01 cm LV Mass Index: 72.7 g/m2 LV % FS        34.7 % LA VOLUME:                               Normal Ranges: LA Vol A4C:        49.7 ml    (22+/-6mL/m2) LA Vol A2C:        60.4 ml LA Vol BP:         58.1 ml LA Vol Index A4C:  27.4ml/m2 LA Vol Index A2C:  33.3 ml/m2 LA Vol Index BP:   32.0 ml/m2 LA Area A4C:       19.5 cm2 LA Area A2C:       22.8 cm2 LA Major Axis A4C: 6.5 cm LA Major Axis A2C: 7.3 cm LA Volume Index:   32.0 ml/m2 RA VOLUME BY A/L METHOD:                               Normal Ranges: RA Vol A4C:        56.6 ml    (8.3-19.5ml) RA Vol Index A4C:  31.2 ml/m2 RA Area A4C:       19.7 cm2 RA Major Axis A4C: 5.8 cm AORTA MEASUREMENTS:                      Normal Ranges: Ao Sinus, d: 2.73 cm (2.1-3.5cm) Ao STJ, d:   2.45 cm  (1.7-3.4cm) Asc Ao, d:   3.51 cm (2.1-3.4cm) LV SYSTOLIC FUNCTION BY 2D PLANIMETRY (MOD):                     Normal Ranges: EF-A4C View: 67.5 % (>=55%) EF-A2C View: 66.2 % EF-Biplane:  66.1 % LV DIASTOLIC FUNCTION:                               Normal Ranges: MV Peak E:        0.68 m/s    (0.7-1.2 m/s) MV Peak A:        1.19 m/s    (0.42-0.7 m/s) E/A Ratio:        0.57        (1.0-2.2) MV lateral e'     0.04 m/s MV medial e'      0.03 m/s PulmV Sys John:    47.20 cm/s PulmV Cotter John:   38.80 cm/s PulmV S/D John:    1.20 PulmV A Revs John: 29.90 cm/s PulmV A Revs Dur: 129.00 msec MITRAL VALVE:                 Normal Ranges: MV DT: 259 msec (150-240msec) AORTIC VALVE:                                   Normal Ranges: AoV Vmax:                1.15 m/s (<=1.7m/s) AoV Peak P.3 mmHg (<20mmHg) AoV Mean PG:             3.0 mmHg (1.7-11.5mmHg) LVOT Max John:            0.47 m/s (<=1.1m/s) AoV VTI:                 26.80 cm (18-25cm) LVOT VTI:                11.10 cm LVOT Diameter:           2.06 cm  (1.8-2.4cm) AoV Area, VTI:           1.38 cm2 (2.5-5.5cm2) AoV Area,Vmax:           1.36 cm2 (2.5-4.5cm2) AoV Dimensionless Index: 0.41  RIGHT VENTRICLE: TAPSE: 16.1 mm RV s'  0.13 m/s TRICUSPID VALVE/RVSP:                   Normal Ranges: IVC Diam: 0.97 cm PULMONIC VALVE:                         Normal Ranges: PV Accel Time: 121 msec (>120ms) PV Max John:    0.9 m/s  (0.6-0.9m/s) PV Max PG:     3.2 mmHg Pulmonary Veins: PulmV A Revs Dur: 129.00 msec PulmV A Revs John: 29.90 cm/s PulmV Cotter John:   38.80 cm/s PulmV S/D John:    1.20 PulmV Sys John:    47.20 cm/s  [id number] Zheng Trevino MD Electronically signed on 4/3/2024 at 6:26:53 PM  ** Final **     MR angio head wo IV contrast    Result Date: 4/3/2024  Interpreted By:  Jess Stout, STUDY: MR ANGIO HEAD WO IV CONTRAST;  4/3/2024 12:06 pm   INDICATION: Signs/Symptoms:Tia.   COMPARISON: None.   ACCESSION NUMBER(S): [id number]   ORDERING CLINICIAN: JODI OLIVEIRA    TECHNIQUE: Time-of-flight MRA of the head was performed. The images were reviewed as source images and maximum intensity projections.   FINDINGS: There is tortuosity of the intracranial vessels.   Anterior circulation:    There is expected flow signal in bilateral intracranial internal carotid arteries, bilateral carotid terminals, bilateral proximal anterior and middle cerebral arteries.   Posterior circulation:    Bilateral intracranial vertebral arteries, vertebrobasilar junction, basilar artery and proximal posterior cerebral arteries demonstrate expected flow signal.       1.  There is no evidence for hemodynamically significant stenosis or large branch vessel cutoffs of the visualized intracranial vasculature.   MACRO: None   Signed by: Jess Stout 4/3/2024 12:15 PM Dictation workstation:   [id number]    MR brain wo IV contrast    Result Date: 4/3/2024  Interpreted By:  Jess Stotu, STUDY: MR BRAIN WO IV CONTRAST;  4/3/2024 12:06 pm   INDICATION: Signs/Symptoms:Tia.   COMPARISON: CT 04/02/2024.   ACCESSION NUMBER(S): [id number]   ORDERING CLINICIAN: JODI OLIVEIRA   TECHNIQUE: Axial T2, FLAIR, DWI, gradient echo T2 and sagittal and coronal T1 weighted images of brain were acquired.   FINDINGS: CSF Spaces: The ventricles, sulci and basal cisterns are within normal limits.   Parenchyma: There is no diffusion restriction abnormality to suggest acute infarct.  Mild degree of nonspecific subcortical and periventricular T2 and FLAIR hyperintense signal is compatible with microangiopathy. There is no mass effect or midline shift.   Paranasal Sinuses and Mastoids: Visualized paranasal sinuses and mastoid air cells are predominantly clear.. There is prominence of the bilateral palatine tonsils which may be reactive.       No evidence of acute infarct, intracranial mass effect or midline shift.   Mild nonspecific white matter signal compatible with microangiopathy.   MACRO: None   Signed by: Jess Stout 4/3/2024  12:13 PM Dictation workstation:   [id number]    CT angio brain attack neck w IV contrast and post procedure    Result Date: 4/2/2024  Interpreted By:  Jess Stout, STUDY: CT ANGIO BRAIN ATTACK NECK W IV CONTRAST AND POST PROCEDURE; 4/2/2024 5:26 pm   INDICATION: Signs/Symptoms:Stroke alert.   COMPARISON: None.   ACCESSION NUMBER(S): [id number]   ORDERING CLINICIAN: NENO DENG   TECHNIQUE: 75 mL Omnipaque 350 was administered intravenously and axial images of the head and neck were acquired.  Coronal, sagittal, and 3-D reconstructions were provided for review.   FINDINGS:     CTA HEAD FINDINGS:   Anterior circulation: The bilateral intracranial internal carotid arteries, bilateral carotid terminals, bilateral proximal anterior and middle cerebral arteries are patent. Atherosclerotic calcification along the cavernous segments of the carotid arteries bilaterally.   Posterior circulation: Bilateral intracranial vertebral arteries, vertebrobasilar junction, basilar artery and proximal posterior cerebral arteries are patent.   CTA NECK FINDINGS:   Mild atherosclerotic calcification along the aortic arch. No significant stenosis at the origins of the great vessels.   Right carotid vessels: The common carotid artery is patent. The carotid bifurcation is unremarkable without significant stenosis.. The internal carotid artery in the neck is patent without significant stenosis.   Left carotid vessels: The common carotid artery is patent. Punctate calcification at the carotid bifurcation without significant stenosis. The internal carotid artery in the neck is patent without significant stenosis.   Vertebral vessels:  The visualized segments of the cervical vertebral arteries are patent.   There is prominence of the bilateral palatine tonsils. There is a fluid collection within the right palatine tonsil measuring 1.4 by 1.0 cm. Lobulated soft tissue at the base of the tongue likely represents prominent lingular  tonsillar tissue. Multiple subcentimeter hyper and hypodense nodules within the thyroid gland. Degenerative changes of the cervical spine.         CTA neck:   No evidence for significant stenosis of the cervical vessels.   There is prominence of the palatine tonsils bilaterally. There is a fluid collection within the right palatine tonsil measuring 1.4 x 1.0 cm.   Multiple subcentimeter nodules within the thyroid gland.   CTA head:   No evidence for significant stenosis or large branch vessel cutoffs of the intracranial vessels.   MACRO: None   Signed by: Jess Stout 4/2/2024 5:38 PM Dictation workstation:   [id number]    CT brain attack head wo IV contrast    Result Date: 4/2/2024  Interpreted By:  Liam Sanders, STUDY: CT BRAIN ATTACK HEAD WO IV CONTRAST;  4/2/2024 5:14 pm   INDICATION: Signs/Symptoms:sudden onset mental status change, headache, expressive aphagia.   COMPARISON: April 14, 2022 and April 19, 2018 head CT examinations.   ACCESSION NUMBER(S): [id number]   ORDERING CLINICIAN: RAMÓN WORTHY   TECHNIQUE: Noncontrast axial CT scan of head was performed. Angled reformats in brain and bone windows were generated. The images were reviewed in bone, brain, blood and soft tissue windows.   FINDINGS: CSF Spaces: The ventricles, sulci and basal cisterns are within normal limits for patient's age. There is no extraaxial fluid collection.   Parenchyma:  The grey-white differentiation is intact. There is no mass effect or midline shift.  There is no intracranial hemorrhage.   Calvarium: The calvarium is unremarkable.   Paranasal sinuses and mastoids: Visualized paranasal sinuses and mastoids are clear.       No evidence of acute cortical infarct or intracranial hemorrhage.     MACRO: Liam Sanders discussed the significance and urgency of this critical finding by secure chat with  NENO DENG on 4/2/2024 at 5:34 pm.  (**-RCF-**) Findings:  See findings.     Signed by: Liam Sanders 4/2/2024 5:34 PM  Dictation workstation:   [id number]    ECG 12 lead    Result Date: 4/2/2024  Normal sinus rhythm Possible Left atrial enlargement Borderline ECG When compared with ECG of 19-MAR-2024 13:44, No significant change was found See ED provider note for full interpretation and clinical correlation Confirmed by Celestina Diehl ([id number]) on 4/2/2024 1:36:07 PM    XR chest 2 views    Result Date: 4/2/2024  Interpreted By:  Beverly Wright, STUDY: XR CHEST 2 VIEWS;  4/2/2024 12:46 pm   INDICATION: Signs/Symptoms:SOB.   COMPARISON: 03/15/2023   ACCESSION NUMBER(S): [id number]   ORDERING CLINICIAN: NJ PIEDRA   FINDINGS: CARDIOMEDIASTINAL SILHOUETTE: Cardiomediastinal silhouette is normal in size and configuration.     LUNGS: Lungs are clear.   ABDOMEN: No remarkable upper abdominal findings.     BONES: No acute osseous changes.       No acute cardiopulmonary process.   MACRO: None   Signed by: Beverly Wright 4/2/2024 1:03 PM Dictation workstation:   [id number]    ECG 12 lead (Clinic Performed)    Result Date: 3/19/2024  Sinus rhythm with 1st degree AV block Otherwise normal ECG When compared with ECG of 19-JUL-2023 11:02, No significant change was found Confirmed by Nj Cantu ([id number]) on 3/19/2024 4:28:33 PM  .     Assessment/Plan   Principal Problem:    TIA (transient ischemic attack)  The patient is a 78-year-old woman with a history of vascular risk factor presenting for dysarthria in setting of palpitation and shortness of breath.  MRI unremarkable less likely vascular in etiology.  However given a potential concern of TIA will do aspirin 81 and plavix 75 mg and atorvastatin 40 mg daily.  After 3 weeks she can go back to monotherapy with aspirin.  Outpatient neurology follow-up in general neurology.  It is okay to schedule appointment with nurse practitioner Familia Horvath I spent 60 minutes in the professional and overall care of this patient.      Aasef G Shaikh, MD PhD

## 2024-04-03 NOTE — PROGRESS NOTES
Alexandra Love is a 78 y.o. female on day 1 of admission presenting with TIA (transient ischemic attack).   04/03/24 1227   Current Planned Discharge Disposition   Current Planned Discharge Disposition Home     AXEL Cerrato, LSW

## 2024-04-03 NOTE — PROGRESS NOTES
Pharmacy Medication History Review    Alexandra Love is a 78 y.o. female admitted for TIA (transient ischemic attack). Pharmacy reviewed the patient's qlmod-ut-irrhebnuk medications and allergies for accuracy.    The list below reflectives the updated PTA list. Please review each medication in order reconciliation for additional clarification and justification.  Prior to Admission medications    Medication Sig Start Date End Date Taking? Authorizing Provider                                                                                                        The list below reflectives the updated allergy list. Please review each documented allergy for additional clarification and justification.  Allergies  Reviewed by Mary Ellen Rubio RN on 4/2/2024        Severity Reactions Comments    Other Not Specified Unknown Loop diuretics    Sulfa (sulfonamide Antibiotics) Not Specified Unknown     Sulfamethoxazole Not Specified Other     Sulfonylureas Not Specified Unknown Sulfones    Thiazides Not Specified Unknown Type diuretics            Below are additional concerns with the patient's PTA list.  Prior to Admission Medications   Prescriptions Last Dose Informant   albuterol 90 mcg/actuation inhaler     Sig: Inhale 1-2 puffs every 6 hours if needed.   amLODIPine (Norvasc) 5 mg tablet     Sig: Take 1 tablet (5 mg) by mouth once daily.   ascorbic acid, vitamin C, 500 mg capsule     Sig: Take 1 capsule by mouth once daily.   aspirin 81 mg EC tablet     Sig: Take 1 tablet (81 mg) by mouth once daily.   atorvastatin (Lipitor) 40 mg tablet     Sig: Take 1 tablet (40 mg) by mouth once daily at bedtime.   co-enzyme Q-10 50 mg capsule     Sig: Take 1 capsule (50 mg) by mouth once daily.   ergocalciferol, vitamin D2, (CALCIFEROL ORAL)     Sig: Vitamin D 1000 units caps---take as directed   ezetimibe (Zetia) 10 mg tablet     Sig: Take 1 tablet (10 mg) by mouth once daily.   latanoprost (Xalatan) 0.005 % ophthalmic solution     Sig:  Administer 1 drop into both eyes once daily at bedtime. As directed   metoprolol tartrate (Lopressor) 50 mg tablet     Sig: Take 1 tablet by mouth 2 times a day.   mupirocin (Bactroban) 2 % ointment     Sig: APPLY TO 3-4X DAILY TO AFFECTED AREA   valsartan (Diovan) 160 mg tablet     Sig: Take 1 tablet (160 mg) by mouth 2 times a day.      Facility-Administered Medications: None    Per patient - not taking co-enzyme anymore to expensive also done with mupirocin it was being used for shingles.  No changes     Nidia Avelar

## 2024-04-04 VITALS
WEIGHT: 177 LBS | OXYGEN SATURATION: 100 % | HEART RATE: 68 BPM | RESPIRATION RATE: 17 BRPM | TEMPERATURE: 97.3 F | BODY MASS INDEX: 32.57 KG/M2 | SYSTOLIC BLOOD PRESSURE: 135 MMHG | DIASTOLIC BLOOD PRESSURE: 78 MMHG | HEIGHT: 62 IN

## 2024-04-04 LAB
ANION GAP SERPL CALC-SCNC: 14 MMOL/L (ref 10–20)
ATRIAL RATE: 48 BPM
BUN SERPL-MCNC: 13 MG/DL (ref 6–23)
CA-I BLD-SCNC: 1.22 MMOL/L (ref 1.1–1.33)
CALCIUM SERPL-MCNC: 9.4 MG/DL (ref 8.6–10.3)
CHLORIDE SERPL-SCNC: 106 MMOL/L (ref 98–107)
CO2 SERPL-SCNC: 23 MMOL/L (ref 21–32)
CREAT SERPL-MCNC: 0.78 MG/DL (ref 0.5–1.05)
EGFRCR SERPLBLD CKD-EPI 2021: 79 ML/MIN/1.73M*2
ERYTHROCYTE [DISTWIDTH] IN BLOOD BY AUTOMATED COUNT: 15.1 % (ref 11.5–14.5)
GLUCOSE SERPL-MCNC: 91 MG/DL (ref 74–99)
HCT VFR BLD AUTO: 43 % (ref 36–46)
HGB BLD-MCNC: 13.5 G/DL (ref 12–16)
MCH RBC QN AUTO: 26.4 PG (ref 26–34)
MCHC RBC AUTO-ENTMCNC: 31.4 G/DL (ref 32–36)
MCV RBC AUTO: 84 FL (ref 80–100)
NRBC BLD-RTO: 0 /100 WBCS (ref 0–0)
P AXIS: 26 DEGREES
P OFFSET: 166 MS
P ONSET: 116 MS
PLATELET # BLD AUTO: 298 X10*3/UL (ref 150–450)
POTASSIUM SERPL-SCNC: 3.6 MMOL/L (ref 3.5–5.3)
PR INTERVAL: 192 MS
Q ONSET: 212 MS
QRS COUNT: 8 BEATS
QRS DURATION: 86 MS
QT INTERVAL: 496 MS
QTC CALCULATION(BAZETT): 443 MS
QTC FREDERICIA: 460 MS
R AXIS: 13 DEGREES
RBC # BLD AUTO: 5.12 X10*6/UL (ref 4–5.2)
SODIUM SERPL-SCNC: 139 MMOL/L (ref 136–145)
T AXIS: 19 DEGREES
T OFFSET: 460 MS
VENTRICULAR RATE: 48 BPM
WBC # BLD AUTO: 7.5 X10*3/UL (ref 4.4–11.3)

## 2024-04-04 PROCEDURE — 99239 HOSP IP/OBS DSCHRG MGMT >30: CPT | Performed by: INTERNAL MEDICINE

## 2024-04-04 PROCEDURE — 80048 BASIC METABOLIC PNL TOTAL CA: CPT | Performed by: NURSE PRACTITIONER

## 2024-04-04 PROCEDURE — 85027 COMPLETE CBC AUTOMATED: CPT | Performed by: NURSE PRACTITIONER

## 2024-04-04 PROCEDURE — 2500000004 HC RX 250 GENERAL PHARMACY W/ HCPCS (ALT 636 FOR OP/ED): Performed by: INTERNAL MEDICINE

## 2024-04-04 PROCEDURE — 82330 ASSAY OF CALCIUM: CPT | Performed by: NURSE PRACTITIONER

## 2024-04-04 PROCEDURE — 2500000001 HC RX 250 WO HCPCS SELF ADMINISTERED DRUGS (ALT 637 FOR MEDICARE OP): Performed by: INTERNAL MEDICINE

## 2024-04-04 PROCEDURE — 36415 COLL VENOUS BLD VENIPUNCTURE: CPT | Performed by: NURSE PRACTITIONER

## 2024-04-04 PROCEDURE — 2500000002 HC RX 250 W HCPCS SELF ADMINISTERED DRUGS (ALT 637 FOR MEDICARE OP, ALT 636 FOR OP/ED): Performed by: INTERNAL MEDICINE

## 2024-04-04 RX ORDER — METOPROLOL TARTRATE 25 MG/1
25 TABLET, FILM COATED ORAL 2 TIMES DAILY
Start: 2024-04-04 | End: 2024-04-04 | Stop reason: SDUPTHER

## 2024-04-04 RX ORDER — CLOPIDOGREL BISULFATE 75 MG/1
75 TABLET ORAL DAILY
Qty: 21 TABLET | Refills: 0 | Status: SHIPPED | OUTPATIENT
Start: 2024-04-05 | End: 2024-04-17 | Stop reason: SDUPTHER

## 2024-04-04 RX ORDER — METOPROLOL TARTRATE 25 MG/1
25 TABLET, FILM COATED ORAL 2 TIMES DAILY
Qty: 60 TABLET | Refills: 0 | Status: SHIPPED | OUTPATIENT
Start: 2024-04-04 | End: 2024-04-25 | Stop reason: SDUPTHER

## 2024-04-04 RX ORDER — METOPROLOL TARTRATE 25 MG/1
25 TABLET, FILM COATED ORAL 2 TIMES DAILY
Status: DISCONTINUED | OUTPATIENT
Start: 2024-04-04 | End: 2024-04-04 | Stop reason: HOSPADM

## 2024-04-04 RX ORDER — AMLODIPINE BESYLATE 5 MG/1
5 TABLET ORAL DAILY
Start: 2024-04-04

## 2024-04-04 RX ORDER — ATORVASTATIN CALCIUM 80 MG/1
80 TABLET, FILM COATED ORAL NIGHTLY
Qty: 30 TABLET | Refills: 0 | Status: SHIPPED | OUTPATIENT
Start: 2024-04-04 | End: 2024-05-04

## 2024-04-04 RX ADMIN — EZETIMIBE 10 MG: 10 TABLET ORAL at 09:21

## 2024-04-04 RX ADMIN — ENOXAPARIN SODIUM 40 MG: 40 INJECTION SUBCUTANEOUS at 09:21

## 2024-04-04 RX ADMIN — PANTOPRAZOLE SODIUM 40 MG: 40 TABLET, DELAYED RELEASE ORAL at 06:09

## 2024-04-04 RX ADMIN — ASPIRIN 81 MG: 81 TABLET, COATED ORAL at 09:21

## 2024-04-04 RX ADMIN — CLOPIDOGREL 75 MG: 75 TABLET ORAL at 09:21

## 2024-04-04 ASSESSMENT — COGNITIVE AND FUNCTIONAL STATUS - GENERAL
DAILY ACTIVITIY SCORE: 24
MOBILITY SCORE: 24

## 2024-04-04 ASSESSMENT — PAIN SCALES - GENERAL: PAINLEVEL_OUTOF10: 0 - NO PAIN

## 2024-04-04 ASSESSMENT — PAIN - FUNCTIONAL ASSESSMENT: PAIN_FUNCTIONAL_ASSESSMENT: 0-10

## 2024-04-04 NOTE — DISCHARGE SUMMARY
Discharge Diagnosis  TIA (transient ischemic attack)    Issues Requiring Follow-Up  Follow-up with cardiology    Test Results Pending At Discharge  Pending Labs       No current pending labs.            Hospital Course  Patient with a past medical history of hypertension dyslipidemia history of palpitations for which she follows with cardiology was doing well until a couple of weeks ago when apparently her medications were changed from brand name to generic  Soon after starting the patient generic medications she started feeling more short of breath  And started having more palpitations  As result she stopped her medications  Over the last couple days she was feeling lightheaded and yesterday the patient's daughter noticed that she was having slurred speech and confusion and brought to the emergency room  Stroke workup was initiated in the ER and CT angio was negative  This morning on my exam the patient was feeling better denies any headaches chest pain or palpitations currently  Speech has returned to normal    Obtain an MRI MRA  They were negative for ischemia  However imaging did show a mobile atherosclerotic plaque in the ascending aorta  She will need to follow-up with cardiology regarding this  The patient's LDL is 122 therefore we increase the patient's atorvastatin to 80  Target LDL is less than 70    Based on the results we will continue with DAPT for a month and then switch back to aspirin every day  Also noted to be bradycardic during the night for which reason we decrease the patient's metoprolol to 25 mg twice a day  She will follow-up with cardiology for further care  Pertinent Physical Exam At Time of Discharge  Physical Exam    Constitutional   General appearance: Alert and in no acute distress.     Pulmonary   Respiratory assessment: No respiratory distress, normal respiratory rhythm and effort.    Auscultation of Lungs: Clear bilateral breath sounds.   Cardiovascular   Auscultation of heart:  Apical pulse normal, heart rate and rhythm normal, normal S1 and S2, no murmurs and no pericardial rub.    Exam for edema: No peripheral edema.   Abdomen   Abdominal Exam: No bruits, normal bowel sounds, soft, non-tender, no abdominal mass palpated.    Liver and Spleen exam: No hepato-splenomegaly.   Musculoskeletal   Examination of gait: Normal.    Inspection of digits and nails: No clubbing or cyanosis of the fingernails.    Inspection/palpation of joints, bones and muscles: No joint swelling. Normal movement of all extremities.   Skin   Skin inspection: Normal skin color and pigmentation, normal skin turgor and no visible rash.   Neurologic   Cranial nerves: Nerves 2-12 were intact, no focal neuro defects.       Home Medications     Medication List      START taking these medications     clopidogrel 75 mg tablet; Commonly known as: Plavix; Take 1 tablet (75   mg) by mouth once daily for 21 days. Stop after 21 days and continue   Aspirin alone Do not start before April 5, 2024.; Start taking on: April 5, 2024     CHANGE how you take these medications     amLODIPine 5 mg tablet; Commonly known as: Norvasc; Take 1 tablet (5 mg)   by mouth once daily. Hold until follow up with PCP due to low blood   pressure; What changed: additional instructions   atorvastatin 80 mg tablet; Commonly known as: Lipitor; Take 1 tablet (80   mg) by mouth once daily at bedtime.; What changed: medication strength,   how much to take   metoprolol tartrate 25 mg tablet; Commonly known as: Lopressor; Take 1   tablet (25 mg) by mouth 2 times a day.; What changed: medication strength,   how much to take     CONTINUE taking these medications     albuterol 90 mcg/actuation inhaler   ascorbic acid (vitamin C) 500 mg capsule   aspirin 81 mg EC tablet   CALCIFEROL ORAL   ezetimibe 10 mg tablet; Commonly known as: Zetia; Take 1 tablet (10 mg)   by mouth once daily.   latanoprost 0.005 % ophthalmic solution; Commonly known as: Xalatan   valsartan  160 mg tablet; Commonly known as: Diovan; Take 1 tablet (160   mg) by mouth 2 times a day.       Outpatient Follow-Up  Future Appointments   Date Time Provider Department [place]   4/17/2024  2:30 PM Nj Cantu MD [id number] [facility]   4/18/2024 11:30 AM Pratima Albert DO [id number] [facility]   9/17/2024 10:00 AM Nj Cantu MD [id number]     Patient seen at bedside. Events from the last visit reviewed. Discussed with staff. Results of tests and investigations from last visit reviewed and discussed with patient/Family. Electronic chart on [facility] reviewed. Input / Recommendations  from consultants  appreciated and reviewed and agreed with.     discharge summary and profile completed. medications reviewed and discussed with patient and family.  scripts completed and signed.     total discharge time in excess of 30 minutes.    Rashmi Graves MD

## 2024-04-04 NOTE — CARE PLAN
The patient's goals for the shift include      The clinical goals for the shift include To be safe and comfortable    Over the shift, the patient did not make progress toward the following goals. Barriers to progression include ac;francine illness. Recommendations to address these barriers include provide a quiet environment.

## 2024-04-05 ENCOUNTER — PATIENT OUTREACH (OUTPATIENT)
Dept: CARE COORDINATION | Facility: CLINIC | Age: 79
End: 2024-04-05
Payer: MEDICARE

## 2024-04-05 NOTE — PROGRESS NOTES
Discharge Facility:[facility]  Discharge Diagnosis:TIA  Admission Date:04/03/24  Discharge Date: 04/04/24    PCP Appointment Date:04/18/24  Specialist Appointment Date:   Hospital Encounter and Summary: Linked   See discharge assessment below for further details  Engagement  Call Start Time: 0826 (4/5/2024  8:26 AM)    Medications  Medications reviewed with patient/caregiver?: Yes (plavix 75mg  and reduce metoprolol to 25mg) (4/5/2024  8:26 AM)  Is the patient having any side effects they believe may be caused by any medication additions or changes?: No (4/5/2024  8:26 AM)  Does the patient have all medications ordered at discharge?: Yes (4/5/2024  8:26 AM)  Is the patient taking all medications as directed (includes completed medication regime)?: Yes (4/5/2024  8:26 AM)    Appointments  Does the patient have a primary care provider?: Yes (4/5/2024  8:26 AM)  Care Management Interventions: Verified appointment date/time/provider (4/5/2024  8:26 AM)    Self Management  Has home health visited the patient within 72 hours of discharge?: No (4/5/2024  8:26 AM)  Has all Durable Medical Equipment (DME) been delivered?: No (4/5/2024  8:26 AM)    Patient Teaching  Does the patient have access to their discharge instructions?: Yes (4/5/2024  8:26 AM)  Care Management Interventions: Reviewed instructions with patient (4/5/2024  8:26 AM)  What is the patient's perception of their health status since discharge?: Improving (still a little sob but is improving) (4/5/2024  8:26 AM)    Wrap Up  Call End Time: 0840 (4/5/2024  8:26 AM)

## 2024-04-17 ENCOUNTER — HOSPITAL ENCOUNTER (OUTPATIENT)
Dept: CARDIOLOGY | Facility: HOSPITAL | Age: 79
Discharge: HOME | End: 2024-04-17
Payer: MEDICARE

## 2024-04-17 ENCOUNTER — OFFICE VISIT (OUTPATIENT)
Dept: CARDIOLOGY | Facility: HOSPITAL | Age: 79
End: 2024-04-17
Payer: MEDICARE

## 2024-04-17 VITALS
BODY MASS INDEX: 33.51 KG/M2 | HEART RATE: 59 BPM | DIASTOLIC BLOOD PRESSURE: 90 MMHG | HEIGHT: 61 IN | SYSTOLIC BLOOD PRESSURE: 175 MMHG | WEIGHT: 177.5 LBS

## 2024-04-17 DIAGNOSIS — R00.2 PALPITATIONS: ICD-10-CM

## 2024-04-17 DIAGNOSIS — G45.9 TIA (TRANSIENT ISCHEMIC ATTACK): ICD-10-CM

## 2024-04-17 DIAGNOSIS — R00.2 PALPITATIONS: Primary | ICD-10-CM

## 2024-04-17 PROCEDURE — 3077F SYST BP >= 140 MM HG: CPT | Performed by: INTERNAL MEDICINE

## 2024-04-17 PROCEDURE — 3080F DIAST BP >= 90 MM HG: CPT | Performed by: INTERNAL MEDICINE

## 2024-04-17 PROCEDURE — 93010 ELECTROCARDIOGRAM REPORT: CPT | Performed by: INTERNAL MEDICINE

## 2024-04-17 PROCEDURE — 1123F ACP DISCUSS/DSCN MKR DOCD: CPT | Performed by: INTERNAL MEDICINE

## 2024-04-17 PROCEDURE — 1036F TOBACCO NON-USER: CPT | Performed by: INTERNAL MEDICINE

## 2024-04-17 PROCEDURE — 99214 OFFICE O/P EST MOD 30 MIN: CPT | Performed by: INTERNAL MEDICINE

## 2024-04-17 PROCEDURE — 93005 ELECTROCARDIOGRAM TRACING: CPT | Mod: 59 | Performed by: INTERNAL MEDICINE

## 2024-04-17 PROCEDURE — 99214 OFFICE O/P EST MOD 30 MIN: CPT | Mod: 25 | Performed by: INTERNAL MEDICINE

## 2024-04-17 PROCEDURE — 1159F MED LIST DOCD IN RCRD: CPT | Performed by: INTERNAL MEDICINE

## 2024-04-17 PROCEDURE — 1111F DSCHRG MED/CURRENT MED MERGE: CPT | Performed by: INTERNAL MEDICINE

## 2024-04-17 PROCEDURE — 1160F RVW MEDS BY RX/DR IN RCRD: CPT | Performed by: INTERNAL MEDICINE

## 2024-04-17 PROCEDURE — 93246 EXT ECG>7D<15D RECORDING: CPT

## 2024-04-17 PROCEDURE — 93248 EXT ECG>7D<15D REV&INTERPJ: CPT | Performed by: INTERNAL MEDICINE

## 2024-04-17 RX ORDER — CLOPIDOGREL BISULFATE 75 MG/1
75 TABLET ORAL DAILY
Qty: 90 TABLET | Refills: 3 | Status: SHIPPED | OUTPATIENT
Start: 2024-04-17 | End: 2025-04-17

## 2024-04-17 RX ORDER — CLOPIDOGREL BISULFATE 75 MG/1
75 TABLET ORAL DAILY
Qty: 90 TABLET | Refills: 3 | Status: SHIPPED | OUTPATIENT
Start: 2024-04-17 | End: 2024-04-17

## 2024-04-18 ENCOUNTER — TELEPHONE (OUTPATIENT)
Dept: PRIMARY CARE | Facility: CLINIC | Age: 79
End: 2024-04-18

## 2024-04-18 ENCOUNTER — OFFICE VISIT (OUTPATIENT)
Dept: PRIMARY CARE | Facility: CLINIC | Age: 79
End: 2024-04-18
Payer: MEDICARE

## 2024-04-18 VITALS
SYSTOLIC BLOOD PRESSURE: 162 MMHG | BODY MASS INDEX: 33.42 KG/M2 | TEMPERATURE: 97.5 F | RESPIRATION RATE: 20 BRPM | OXYGEN SATURATION: 96 % | HEART RATE: 61 BPM | DIASTOLIC BLOOD PRESSURE: 82 MMHG | HEIGHT: 61 IN | WEIGHT: 177 LBS

## 2024-04-18 DIAGNOSIS — G45.9 TIA (TRANSIENT ISCHEMIC ATTACK): Primary | ICD-10-CM

## 2024-04-18 DIAGNOSIS — E78.49 OTHER HYPERLIPIDEMIA: ICD-10-CM

## 2024-04-18 DIAGNOSIS — R00.2 PALPITATIONS: ICD-10-CM

## 2024-04-18 DIAGNOSIS — I10 PRIMARY HYPERTENSION: ICD-10-CM

## 2024-04-18 PROCEDURE — 1123F ACP DISCUSS/DSCN MKR DOCD: CPT | Performed by: FAMILY MEDICINE

## 2024-04-18 PROCEDURE — 3079F DIAST BP 80-89 MM HG: CPT | Performed by: FAMILY MEDICINE

## 2024-04-18 PROCEDURE — 1159F MED LIST DOCD IN RCRD: CPT | Performed by: FAMILY MEDICINE

## 2024-04-18 PROCEDURE — 1036F TOBACCO NON-USER: CPT | Performed by: FAMILY MEDICINE

## 2024-04-18 PROCEDURE — 1111F DSCHRG MED/CURRENT MED MERGE: CPT | Performed by: FAMILY MEDICINE

## 2024-04-18 PROCEDURE — 99495 TRANSJ CARE MGMT MOD F2F 14D: CPT | Performed by: FAMILY MEDICINE

## 2024-04-18 PROCEDURE — 3077F SYST BP >= 140 MM HG: CPT | Performed by: FAMILY MEDICINE

## 2024-04-18 PROCEDURE — 1160F RVW MEDS BY RX/DR IN RCRD: CPT | Performed by: FAMILY MEDICINE

## 2024-04-18 PROCEDURE — 1126F AMNT PAIN NOTED NONE PRSNT: CPT | Performed by: FAMILY MEDICINE

## 2024-04-18 ASSESSMENT — PATIENT HEALTH QUESTIONNAIRE - PHQ9
SUM OF ALL RESPONSES TO PHQ9 QUESTIONS 1 AND 2: 0
2. FEELING DOWN, DEPRESSED OR HOPELESS: NOT AT ALL
1. LITTLE INTEREST OR PLEASURE IN DOING THINGS: NOT AT ALL

## 2024-04-18 ASSESSMENT — PAIN SCALES - GENERAL: PAINLEVEL: 0-NO PAIN

## 2024-04-18 NOTE — PROGRESS NOTES
Subjective   Patient ID: Alexandra Love is a 78 y.o. female who presents for ER F/UP.    HPI   The patient presents to the clinic for hospital follow-up. She has past medical history of hypertension, hyperlipidemia, heart palpitations, colon polyps, recurrent UTIs, ankle edema, TIA, shingles polyneuropathy, and seborrehic keratosis.    She is here with her youngest son today    The patient recalls that she visited the ED on 04/02/2024 with complaints of SOB, slurred speech, and confusion (possible TIA). She recalls that her symptoms began after changing from brand name medication to generic medication (metoprolol, atorvastatin, amlodipine). She discontinued her medications temporarily (prior to her hospital episode) as she had been feeling more SOB/heart palpitations. Imaging and lab results done at the hospital were mostly normal, however imaging showed  atherosclerotic plaque in the ascending aorta. Her LDL was also 122 when checked in the hospital, so her atorvastatin medication was increased to 80 mg daily. Her metoprolol tartrate medication was also decreased to 25 mg (2 times per day) as she was noted to be bradycardic during the night. Her amlodipine 5 mg medication was also put on hold and she was started on Plavix 75 mg medication. She was discharged from the hospital in stable condition on 04/04/2024 (without a heart monitor). The patient states that she followed up with her cardiologist (Dr. Cantu) yesterday (04/17/2024). She states that her cardiologist advised her to continue on aspirin and Plavix medication for now. She also received a 2-week heart monitor to evaluate for possible atrial fibrillation. She was re-started on amlodipine 5 mg medication by Dr. Cantu as her blood pressure was elevated during her visit yesterday. Today, her blood pressure (162/82) was slightly elevated when checked in the clinic. The patient states that she does experiencing fluttering symptoms occasionally for a  "second or 2 with no associated s/s.  She has her heart monitor on and she is writing in her diary for any symptoms    She states she is confused since she was told stay on plavix for 21 days and Dr Cantu wants her to stay on longer for now.  Discussed w her  cardiology wants to see if having a fib and if so will need blood thinner long term.  She needs to stay on at least until monitor results come back and if having afib off and on will need long term.  She understands today and feels better about this.  She has refills from Dr Rivas and will continue plavix w baby asa    She is not having any cp or sob.  She had some sob prior to going to ER.  She is back to all her normal activity and is feeling well.  She restarted amlodipine as directed by cardiology bp mildly elevated today but some better than prior    The patient reports bowel issues. She inquires if she can take an OTC stool softener every other night as she was doing in the past so she is regular.  She is concerned if ok w new medication.  She has resumsed and not having problems and told ok to use stool softener as needed.    Review of Systems    Objective   /82   Pulse 61   Temp 36.4 °C (97.5 °F)   Resp 20   Ht 1.549 m (5' 1\")   Wt 80.3 kg (177 lb)   SpO2 96%   BMI 33.44 kg/m²     Physical Exam  Constitutional:       Appearance: Normal appearance.   Neck:      Vascular: No carotid bruit.   Cardiovascular:      Rate and Rhythm: Normal rate and regular rhythm.      Pulses: Normal pulses.      Heart sounds: Normal heart sounds.   Pulmonary:      Effort: Pulmonary effort is normal.      Breath sounds: Normal breath sounds.   Abdominal:      General: Bowel sounds are normal. There is no distension.      Palpations: Abdomen is soft. There is no mass.   Musculoskeletal:         General: Normal range of motion.      Cervical back: Normal range of motion.      Right lower leg: No edema.      Left lower leg: No edema.   Skin:     General: " Skin is warm and dry.   Neurological:      Mental Status: She is alert and oriented to person, place, and time.   Psychiatric:         Mood and Affect: Mood normal.         Behavior: Behavior normal.         Thought Content: Thought content normal.         Judgment: Judgment normal.         Assessment/Plan   Problem List Items Addressed This Visit             ICD-10-CM    Hyperlipidemia E78.5    Hypertension I10    Palpitations R00.2    TIA (transient ischemic attack) - Primary G45.9          In regards to concerns with her recent hospital encounter (possible TIA), her imaging/lab results from the hospital were reviewed. The patient was advised to continue on Plavix and aspirin medications as directed by Dr. Cantu (cardiologist). She was also advised to continue taking antihypertensive medication as directed by her specialists. She was advised to continue following up with specialists for additional treatment following this encounter. She will be evaluated further based on the results of her 2-week heart monitor. She was advised to continue monitoring symptoms for improvement/exacerbation and to report to the ED if she feels similar symptoms (SOB, heart palpitations, speech difficulty, confusion) again.    In regards to concerns with bowel issues, the patient was advised that she can continue using an OTC stool softener every other day if it enables her to have regular bowel movements. Continue monitoring symptoms for improvement/exacerbation.    Reviewed med list and how she is taking them.  Answered questions and reviewed hospital results w pt and her son    She will follow-up in 3 months (repeat labs at that time), unless otherwise needed.    Scribe Attestation  By signing my name below, IJose Juan Scribe   attest that this documentation has been prepared under the direction and in the presence of Pratima Albert DO.

## 2024-04-19 ENCOUNTER — PATIENT OUTREACH (OUTPATIENT)
Dept: CARE COORDINATION | Facility: CLINIC | Age: 79
End: 2024-04-19
Payer: MEDICARE

## 2024-04-19 LAB
ATRIAL RATE: 59 BPM
P AXIS: 37 DEGREES
P OFFSET: 182 MS
P ONSET: 119 MS
PR INTERVAL: 210 MS
Q ONSET: 224 MS
QRS COUNT: 9 BEATS
QRS DURATION: 84 MS
QT INTERVAL: 424 MS
QTC CALCULATION(BAZETT): 419 MS
QTC FREDERICIA: 421 MS
R AXIS: -20 DEGREES
T AXIS: 43 DEGREES
T OFFSET: 436 MS
VENTRICULAR RATE: 59 BPM

## 2024-04-19 NOTE — PROGRESS NOTES
Unable to reach patient for call back after patient's follow up appointment with PCP.   ALPAM with call back number for patient to call if needed   If no voicemail available call attempts x 2 were made to contact the patient to assist with any questions or concerns patient may have.

## 2024-04-19 NOTE — PROGRESS NOTES
"Patient: Alexandra Love  : 1945  PCP: Pratima Albert DO  MRN: [id number]  Program: Transitional Care Management  Status: Enrolled  Effective Dates: 2024 - present  Responsible Staff: Jordana Ohcoa LPN  Social Determinants to be Addressed: Physical Activity, Social Connections, Stress         Alexandra Love is a 78 y.o. female presenting today for follow-up after being discharged from the hospital 14 days ago. The main problem requiring admission was TIA. The discharge summary and/or Transitional Care Management documentation was reviewed. Medication reconciliation was performed as indicated via the \"Juancarlos as Reviewed\" timestamp.     Alexandra Love was contacted by Transitional Care Management services two days after her discharge. This encounter and supporting documentation was reviewed.    Review of Systems    /82   Pulse 61   Temp 36.4 °C (97.5 °F)   Resp 20   Ht 1.549 m (5' 1\")   Wt 80.3 kg (177 lb)   SpO2 96%   BMI 33.44 kg/m²     Physical Exam    The complexity of medical decision making for this patient's transitional care is moderate.    Assessment/Plan   Problem List Items Addressed This Visit             ICD-10-CM    Hyperlipidemia E78.5    Hypertension I10    Palpitations R00.2    TIA (transient ischemic attack) - Primary G45.9       "

## 2024-04-22 DIAGNOSIS — G45.9 TIA (TRANSIENT ISCHEMIC ATTACK): Primary | ICD-10-CM

## 2024-04-25 DIAGNOSIS — R00.2 PALPITATIONS: ICD-10-CM

## 2024-04-25 RX ORDER — METOPROLOL TARTRATE 25 MG/1
25 TABLET, FILM COATED ORAL 2 TIMES DAILY
Qty: 90 TABLET | Refills: 0 | Status: SHIPPED | OUTPATIENT
Start: 2024-04-25

## 2024-05-01 ENCOUNTER — OFFICE VISIT (OUTPATIENT)
Dept: CARDIOLOGY | Facility: HOSPITAL | Age: 79
End: 2024-05-01
Payer: MEDICARE

## 2024-05-01 VITALS
SYSTOLIC BLOOD PRESSURE: 142 MMHG | BODY MASS INDEX: 33.14 KG/M2 | HEART RATE: 70 BPM | OXYGEN SATURATION: 98 % | DIASTOLIC BLOOD PRESSURE: 80 MMHG | HEIGHT: 61 IN | WEIGHT: 175.5 LBS

## 2024-05-01 DIAGNOSIS — I10 PRIMARY HYPERTENSION: ICD-10-CM

## 2024-05-01 DIAGNOSIS — E78.00 PURE HYPERCHOLESTEROLEMIA: Primary | ICD-10-CM

## 2024-05-01 PROCEDURE — 99214 OFFICE O/P EST MOD 30 MIN: CPT | Performed by: NURSE PRACTITIONER

## 2024-05-01 PROCEDURE — 3079F DIAST BP 80-89 MM HG: CPT | Performed by: NURSE PRACTITIONER

## 2024-05-01 PROCEDURE — 3077F SYST BP >= 140 MM HG: CPT | Performed by: NURSE PRACTITIONER

## 2024-05-01 PROCEDURE — 1036F TOBACCO NON-USER: CPT | Performed by: NURSE PRACTITIONER

## 2024-05-01 PROCEDURE — 1159F MED LIST DOCD IN RCRD: CPT | Performed by: NURSE PRACTITIONER

## 2024-05-01 PROCEDURE — 1123F ACP DISCUSS/DSCN MKR DOCD: CPT | Performed by: NURSE PRACTITIONER

## 2024-05-01 PROCEDURE — 1160F RVW MEDS BY RX/DR IN RCRD: CPT | Performed by: NURSE PRACTITIONER

## 2024-05-01 PROCEDURE — 1111F DSCHRG MED/CURRENT MED MERGE: CPT | Performed by: NURSE PRACTITIONER

## 2024-05-01 RX ORDER — ACETAMINOPHEN 500 MG
1 TABLET ORAL DAILY
Qty: 1 KIT | Refills: 0 | Status: SHIPPED | OUTPATIENT
Start: 2024-05-01

## 2024-05-01 NOTE — PROGRESS NOTES
Subjective   Alexandra Love is a 78 y.o. female.    Chief Complaint:  Hyperlipidemia and Hypertension    HPI  Mrs. Love is seen for a 2-week follow-up.  She is accompanied by her son.  She turned in her 2-week cardiac monitor today.  She is tolerating the re initiation of amlodipine.  She has no particular cardiovascular concerns.  She continues to have difficulty finding words since her TIA in early April although she thinks it may be somewhat antonio than before.  She is tolerating dual antiplatelet therapy and denies any bleeding concerns.     Review of Systems   Cardiovascular: Negative.    Respiratory: Negative.     All other systems reviewed and are negative.      Objective   Gen.: Well-developed, well-nourished in no acute distress.  Eyes: Conjunctivae are pink.  HEENT: Normocephalic, atraumatic. Oral pharynx is clear.  Neck: Supple, JVP is normal. 2+ carotid pulses without bruit.  Pulmonary: Normal respiratory effort, clear to auscultation.  Cardiovascular: Regular rate, normal S1 and S2, 1/6 systolic murmur, rubs or gallops.  Abdomen: Soft, nontender, nondistended.  Extremities: Warm without edema. 2+ radial pulses bilaterally  Musculoskeletal: Normal gait. Normal ROM.  Skin: No rash.  Neurologic: Alert and oriented ×3  Psychiatric: Normal mood and affect    Lab Review:   Lab Results   Component Value Date     04/04/2024    K 3.6 04/04/2024     04/04/2024    CO2 23 04/04/2024    BUN 13 04/04/2024    CREATININE 0.78 04/04/2024    GLUCOSE 91 04/04/2024    CALCIUM 9.4 04/04/2024     Lab Results   Component Value Date    WBC 7.5 04/04/2024    HGB 13.5 04/04/2024    HCT 43.0 04/04/2024    MCV 84 04/04/2024     04/04/2024     Lab Results   Component Value Date    CHOL 199 04/02/2024    TRIG 98 04/02/2024    HDL 57.1 04/02/2024       Assessment/Plan   The encounter diagnosis was Primary hypertension.  Mrs. Love is a pleasant 78 year old  female with a past medical history  significant for hypertension, hyperlipidemia and palpitations.  She had a TIA 4/2024.  Echocardiogram 4/3/2024 showed showed an EF of 70 to 75% with an atheroma in the ascending aorta. NM stress test 10/2022 showed no evidence of ischemia with normal LVEF.  She presents for follow-up of her blood pressure and to return a Holter monitor today.  We will review the monitor results when they are available.  Blood pressure once she had been sitting for several minutes is improved.  She was given a prescription for home blood pressure cuff.  I asked that she check her blood pressure on occasion 2 to 3 hours after she takes her antihypertensive medication.  She will write down the values bring them in her monitor to her next appointment.  She will call with any values consistently over 140 systolic. We will repeat a lipid panel in the next few weeks as well.  Her medication will remain unchanged including DAPT.  She will follow-up in 3-4 months and with her PCP as scheduled.

## 2024-05-02 ENCOUNTER — PATIENT OUTREACH (OUTPATIENT)
Dept: CARE COORDINATION | Facility: CLINIC | Age: 79
End: 2024-05-02

## 2024-05-02 ENCOUNTER — APPOINTMENT (OUTPATIENT)
Dept: PHARMACY | Facility: HOSPITAL | Age: 79
End: 2024-05-02
Payer: MEDICARE

## 2024-05-02 ASSESSMENT — ENCOUNTER SYMPTOMS
CARDIOVASCULAR NEGATIVE: 1
RESPIRATORY NEGATIVE: 1

## 2024-05-02 NOTE — PATIENT INSTRUCTIONS
Continue all medication    Check cholesterol in 2 to 3 months with your PCP or prior to your visit with me    Check blood pressure 2-3 times per week 2 to 3 hours after you take your blood pressure medication    Write down your blood pressure and bring the readings with your blood pressure machine to your next visit    Call for any questions or concerns or if your blood pressure is consistently over 140/85

## 2024-05-10 LAB — BODY SURFACE AREA: 1.86 M2

## 2024-05-22 ENCOUNTER — APPOINTMENT (OUTPATIENT)
Dept: CARDIOLOGY | Facility: HOSPITAL | Age: 79
End: 2024-05-22
Payer: MEDICARE

## 2024-06-08 DIAGNOSIS — E78.2 MIXED HYPERLIPIDEMIA: ICD-10-CM

## 2024-06-08 DIAGNOSIS — G45.9 TIA (TRANSIENT ISCHEMIC ATTACK): ICD-10-CM

## 2024-06-08 DIAGNOSIS — R22.42 MASS OF LEFT THIGH: ICD-10-CM

## 2024-06-08 DIAGNOSIS — R00.2 PALPITATIONS: ICD-10-CM

## 2024-06-12 DIAGNOSIS — R00.2 PALPITATIONS: ICD-10-CM

## 2024-06-12 RX ORDER — METOPROLOL TARTRATE 25 MG/1
25 TABLET, FILM COATED ORAL 2 TIMES DAILY
Qty: 180 TABLET | Refills: 0 | Status: SHIPPED | OUTPATIENT
Start: 2024-06-12

## 2024-06-12 RX ORDER — EZETIMIBE 10 MG/1
10 TABLET ORAL DAILY
Qty: 90 TABLET | Refills: 0 | Status: SHIPPED | OUTPATIENT
Start: 2024-06-12

## 2024-06-12 RX ORDER — ATORVASTATIN CALCIUM 80 MG/1
80 TABLET, FILM COATED ORAL NIGHTLY
Qty: 90 TABLET | Refills: 0 | Status: SHIPPED | OUTPATIENT
Start: 2024-06-12 | End: 2024-09-10

## 2024-06-12 RX ORDER — METOPROLOL TARTRATE 25 MG/1
25 TABLET, FILM COATED ORAL 2 TIMES DAILY
Qty: 180 TABLET | Refills: 1 | Status: SHIPPED | OUTPATIENT
Start: 2024-06-12 | End: 2024-06-12 | Stop reason: SDUPTHER

## 2024-06-12 RX ORDER — VALSARTAN 160 MG/1
160 TABLET ORAL 2 TIMES DAILY
Qty: 180 TABLET | Refills: 1 | Status: SHIPPED | OUTPATIENT
Start: 2024-06-12 | End: 2024-12-09

## 2024-06-12 RX ORDER — AMLODIPINE BESYLATE 5 MG/1
5 TABLET ORAL DAILY
Qty: 90 TABLET | Refills: 0 | Status: SHIPPED | OUTPATIENT
Start: 2024-06-12 | End: 2024-09-10

## 2024-07-02 ENCOUNTER — TELEPHONE (OUTPATIENT)
Dept: PRIMARY CARE | Facility: CLINIC | Age: 79
End: 2024-07-02
Payer: MEDICARE

## 2024-07-02 ENCOUNTER — APPOINTMENT (OUTPATIENT)
Dept: RADIOLOGY | Facility: HOSPITAL | Age: 79
End: 2024-07-02
Payer: MEDICARE

## 2024-07-02 ENCOUNTER — PATIENT OUTREACH (OUTPATIENT)
Dept: CARE COORDINATION | Facility: CLINIC | Age: 79
End: 2024-07-02
Payer: MEDICARE

## 2024-07-02 ENCOUNTER — HOSPITAL ENCOUNTER (EMERGENCY)
Facility: HOSPITAL | Age: 79
Discharge: HOME | End: 2024-07-02
Attending: GENERAL PRACTICE
Payer: MEDICARE

## 2024-07-02 VITALS
BODY MASS INDEX: 33.04 KG/M2 | TEMPERATURE: 98.6 F | WEIGHT: 175 LBS | HEIGHT: 61 IN | OXYGEN SATURATION: 97 % | DIASTOLIC BLOOD PRESSURE: 83 MMHG | HEART RATE: 68 BPM | SYSTOLIC BLOOD PRESSURE: 146 MMHG | RESPIRATION RATE: 16 BRPM

## 2024-07-02 DIAGNOSIS — K62.5 RECTAL BLEEDING: Primary | ICD-10-CM

## 2024-07-02 LAB
ABO GROUP (TYPE) IN BLOOD: NORMAL
ALBUMIN SERPL BCP-MCNC: 4.2 G/DL (ref 3.4–5)
ALP SERPL-CCNC: 61 U/L (ref 33–136)
ALT SERPL W P-5'-P-CCNC: 25 U/L (ref 7–45)
ANION GAP SERPL CALC-SCNC: 13 MMOL/L (ref 10–20)
ANTIBODY SCREEN: NORMAL
AST SERPL W P-5'-P-CCNC: 24 U/L (ref 9–39)
BASOPHILS # BLD AUTO: 0.05 X10*3/UL (ref 0–0.1)
BASOPHILS NFR BLD AUTO: 0.6 %
BILIRUB SERPL-MCNC: 1 MG/DL (ref 0–1.2)
BUN SERPL-MCNC: 9 MG/DL (ref 6–23)
CALCIUM SERPL-MCNC: 9.5 MG/DL (ref 8.6–10.3)
CHLORIDE SERPL-SCNC: 105 MMOL/L (ref 98–107)
CO2 SERPL-SCNC: 26 MMOL/L (ref 21–32)
CREAT SERPL-MCNC: 0.69 MG/DL (ref 0.5–1.05)
EGFRCR SERPLBLD CKD-EPI 2021: 90 ML/MIN/1.73M*2
EOSINOPHIL # BLD AUTO: 0.4 X10*3/UL (ref 0–0.4)
EOSINOPHIL NFR BLD AUTO: 5 %
ERYTHROCYTE [DISTWIDTH] IN BLOOD BY AUTOMATED COUNT: 15 % (ref 11.5–14.5)
GLUCOSE SERPL-MCNC: 104 MG/DL (ref 74–99)
HCT VFR BLD AUTO: 42.1 % (ref 36–46)
HGB BLD-MCNC: 13.4 G/DL (ref 12–16)
IMM GRANULOCYTES # BLD AUTO: 0.02 X10*3/UL (ref 0–0.5)
IMM GRANULOCYTES NFR BLD AUTO: 0.3 % (ref 0–0.9)
INR PPP: 1.1 (ref 0.9–1.1)
LYMPHOCYTES # BLD AUTO: 2.71 X10*3/UL (ref 0.8–3)
LYMPHOCYTES NFR BLD AUTO: 34.2 %
MCH RBC QN AUTO: 26.3 PG (ref 26–34)
MCHC RBC AUTO-ENTMCNC: 31.8 G/DL (ref 32–36)
MCV RBC AUTO: 83 FL (ref 80–100)
MONOCYTES # BLD AUTO: 0.69 X10*3/UL (ref 0.05–0.8)
MONOCYTES NFR BLD AUTO: 8.7 %
NEUTROPHILS # BLD AUTO: 4.06 X10*3/UL (ref 1.6–5.5)
NEUTROPHILS NFR BLD AUTO: 51.2 %
NRBC BLD-RTO: 0 /100 WBCS (ref 0–0)
PLATELET # BLD AUTO: 330 X10*3/UL (ref 150–450)
POTASSIUM SERPL-SCNC: 3.9 MMOL/L (ref 3.5–5.3)
PROT SERPL-MCNC: 7.6 G/DL (ref 6.4–8.2)
PROTHROMBIN TIME: 12 SECONDS (ref 9.8–12.8)
RBC # BLD AUTO: 5.09 X10*6/UL (ref 4–5.2)
RH FACTOR (ANTIGEN D): NORMAL
SODIUM SERPL-SCNC: 140 MMOL/L (ref 136–145)
WBC # BLD AUTO: 7.9 X10*3/UL (ref 4.4–11.3)

## 2024-07-02 PROCEDURE — 86850 RBC ANTIBODY SCREEN: CPT | Performed by: GENERAL PRACTICE

## 2024-07-02 PROCEDURE — 84075 ASSAY ALKALINE PHOSPHATASE: CPT | Performed by: GENERAL PRACTICE

## 2024-07-02 PROCEDURE — 36415 COLL VENOUS BLD VENIPUNCTURE: CPT | Performed by: GENERAL PRACTICE

## 2024-07-02 PROCEDURE — 2550000001 HC RX 255 CONTRASTS: Performed by: GENERAL PRACTICE

## 2024-07-02 PROCEDURE — 99284 EMERGENCY DEPT VISIT MOD MDM: CPT | Mod: 25

## 2024-07-02 PROCEDURE — 85025 COMPLETE CBC W/AUTO DIFF WBC: CPT | Performed by: GENERAL PRACTICE

## 2024-07-02 PROCEDURE — 85610 PROTHROMBIN TIME: CPT | Performed by: GENERAL PRACTICE

## 2024-07-02 PROCEDURE — 74174 CTA ABD&PLVS W/CONTRAST: CPT | Mod: FOREIGN READ | Performed by: RADIOLOGY

## 2024-07-02 PROCEDURE — 74174 CTA ABD&PLVS W/CONTRAST: CPT

## 2024-07-02 RX ADMIN — IOHEXOL 90 ML: 350 INJECTION, SOLUTION INTRAVENOUS at 16:16

## 2024-07-02 ASSESSMENT — PAIN - FUNCTIONAL ASSESSMENT: PAIN_FUNCTIONAL_ASSESSMENT: 0-10

## 2024-07-02 ASSESSMENT — COLUMBIA-SUICIDE SEVERITY RATING SCALE - C-SSRS
6. HAVE YOU EVER DONE ANYTHING, STARTED TO DO ANYTHING, OR PREPARED TO DO ANYTHING TO END YOUR LIFE?: NO
2. HAVE YOU ACTUALLY HAD ANY THOUGHTS OF KILLING YOURSELF?: NO
1. IN THE PAST MONTH, HAVE YOU WISHED YOU WERE DEAD OR WISHED YOU COULD GO TO SLEEP AND NOT WAKE UP?: NO

## 2024-07-02 NOTE — ED PROVIDER NOTES
HPI   Chief Complaint   Patient presents with    Rectal Bleeding       HPI: 78-year-old female on Plavix for history of TIA presents with rectal bleeding.  This morning she had 2 bowel movements that were bloody.  The second was significantly less bloody than the first.  This is the first time this has happened and she denies abdominal pain, lightheadedness.  She has no rectal bleeding at rest      Limitations to history: None  Independent Historians: Patient  External Records Reviewed: HIE, outpatient notes, inpatient notes  ------------------------------------------------------------------------------------------------------------------------------------------  ROS: a ten point review of systems was performed and was negative except as per HPI.  ------------------------------------------------------------------------------------------------------------------------------------------  PMH / PSH: as per HPI, otherwise reviewed in EMR  MEDS: as per HPI, otherwise reviewed in EMR  ALLERGIES: as per HPI, otherwise reviewed in EMR  SocH:  as per HPI, otherwise reviewed in EMR  FH:  as per HPI, otherwise reviewed in EMR  ------------------------------------------------------------------------------------------------------------------------------------------  Physical Exam:  VS: As documented in the triage note and EMR flowsheet from this visit was reviewed  General: Well appearing. No acute distress.   Eyes:  Extraocular movements grossly intact. No scleral icterus. No discharge  HEENT:  Normocephalic.  Atraumatic  Neck: Moves neck freely. No gross masses  CV: Regular rhythm. No murmurs, rubs or gallops   Resp: Clear to auscultation bilaterally. No respiratory distress.    GI: Soft, no masses, nontender. No rebound tenderness or guarding  MSK: Symmetric muscle bulk. No deformities. No lower extremity edema.    Skin: Warm, dry, intact.   Neuro: No focal deficits.  A&O x3.   Psych: Appropriate for  situation  ------------------------------------------------------------------------------------------------------------------------------------------  Hospital Course / Medical Decision Making:  Independent Interpretations: CTA abdomen/pelvis  EKG as interpreted by me: There is a great deal of artifact on this EKG but this is a regular rhythm at 82 bpm with a first-degree AV block with a WV interval of 226 ms with no obvious signs of acute ischemia    MDM: 78-year-old female on Plavix for history of TIA presents with rectal bleeding.  She has no rectal bleeding at rest and reports that she did go to the bathroom while in the ED and noted no blood.  No anemia noted.  She is well-appearing and hemodynamically stable.  BUN to creatinine ratio is not markedly elevated to suggest upper GI bleed.  CT shows no acute signs of bleeding.  The patient was provided with a copy of her CT results.  She feels safe going home.  She was provided with a gastroenterology referral.  She will follow-up with her primary care physician as soon as possible and will return to the ED for any worsening of her symptoms or any other concerning symptoms.    Discussion of Management with Other Providers:   I discussed the patient/results with: Emergency medicine team    Final diagnosis and disposition as below.    Results for orders placed or performed during the hospital encounter of 07/02/24  -CBC and Auto Differential:        Result                      Value             Ref Range           WBC                         7.9               4.4 - 11.3 x*       nRBC                        0.0               0.0 - 0.0 /1*       RBC                         5.09              4.00 - 5.20 *       Hemoglobin                  13.4              12.0 - 16.0 *       Hematocrit                  42.1              36.0 - 46.0 %       MCV                         83                80 - 100 fL         MCH                         26.3              26.0 - 34.0 *        MCHC                        31.8 (L)          32.0 - 36.0 *       RDW                         15.0 (H)          11.5 - 14.5 %       Platelets                   330               150 - 450 x1*       Neutrophils %               51.2              40.0 - 80.0 %       Immature Granulocytes *     0.3               0.0 - 0.9 %         Lymphocytes %               34.2              13.0 - 44.0 %       Monocytes %                 8.7               2.0 - 10.0 %        Eosinophils %               5.0               0.0 - 6.0 %         Basophils %                 0.6               0.0 - 2.0 %         Neutrophils Absolute        4.06              1.60 - 5.50 *       Immature Granulocytes *     0.02              0.00 - 0.50 *       Lymphocytes Absolute        2.71              0.80 - 3.00 *       Monocytes Absolute          0.69              0.05 - 0.80 *       Eosinophils Absolute        0.40              0.00 - 0.40 *       Basophils Absolute          0.05              0.00 - 0.10 *  -Comprehensive metabolic panel:        Result                      Value             Ref Range           Glucose                     104 (H)           74 - 99 mg/dL       Sodium                      140               136 - 145 mm*       Potassium                   3.9               3.5 - 5.3 mm*       Chloride                    105               98 - 107 mmo*       Bicarbonate                 26                21 - 32 mmol*       Anion Gap                   13                10 - 20 mmol*       Urea Nitrogen               9                 6 - 23 mg/dL        Creatinine                  0.69              0.50 - 1.05 *       eGFR                        90                >60 mL/min/1*       Calcium                     9.5               8.6 - 10.3 m*       Albumin                     4.2               3.4 - 5.0 g/*       Alkaline Phosphatase        61                33 - 136 U/L        Total Protein               7.6               6.4 - 8.2 g/*        AST                         24                9 - 39 U/L          Bilirubin, Total            1.0               0.0 - 1.2 mg*       ALT                         25                7 - 45 U/L     -Protime-INR:        Result                      Value             Ref Range           Protime                     12.0              9.8 - 12.8 s*       INR                         1.1               0.9 - 1.1      -Type and Screen:        Result                      Value             Ref Range           ABO TYPE                    O                                     Rh TYPE                     POS                                   ANTIBODY SCREEN             NEG                              CT angio abdomen pelvis w and or wo IV IV contrast   Final Result    1. No evidence for active GI bleed    2. No acute pathology in the abdomen or pelvis.    Signed by Slade Smith MD                                 Bebe Coma Scale Score: 15                     Patient History   Past Medical History:   Diagnosis Date    Colon polyp     Delayed emergence from general anesthesia     Dysuria 2016    Burning with urination    Encounter for examination of blood pressure without abnormal findings 2015    Blood pressure check    Encounter for examination of blood pressure without abnormal findings 2015    Blood pressure check    Hyperlipidemia     Hypertension     Other conditions influencing health status 2015    Follow-up arranged    Personal history of colonic polyps 2018    History of colon polyps    Personal history of other diseases of the circulatory system 2018    History of hypertension    Personal history of other diseases of the circulatory system 2016    History of hypertension    Rash and other nonspecific skin eruption 2022    Rash    Unspecified abdominal pain 2018    Abdominal pain     Past Surgical History:   Procedure Laterality Date     SECTION, LOW TRANSVERSE       TOTAL ABDOMINAL HYSTERECTOMY W/ BILATERAL SALPINGOOPHORECTOMY  06/24/2014    Total Abdominal Hysterectomy With Removal Of Both Ovaries     Family History   Problem Relation Name Age of Onset    Ovarian cancer Mother      Cancer Father          gastric cancer    Colon cancer Sister      Diabetes Other      Breast cancer Neg Hx       Social History     Tobacco Use    Smoking status: Never    Smokeless tobacco: Never   Vaping Use    Vaping status: Never Used   Substance Use Topics    Alcohol use: Not Currently    Drug use: Not Currently       Physical Exam   ED Triage Vitals [07/02/24 1235]   Temperature Heart Rate Respirations BP   37 °C (98.6 °F) 69 16 178/88      Pulse Ox Temp src Heart Rate Source Patient Position   99 % -- -- --      BP Location FiO2 (%)     -- --       Physical Exam    ED Course & MDM   Diagnoses as of 07/07/24 1619   Rectal bleeding       Medical Decision Making      Procedure  Procedures     Richardson Aaron DO  07/07/24 1622

## 2024-07-02 NOTE — PROGRESS NOTES
Patient has met target of no readmission for (90) days post   discharge and is graduated from Transitional Care Management program at this time.

## 2024-07-02 NOTE — TELEPHONE ENCOUNTER
I called patient and advised her to go to the ER not sure she will but She does know that per  we advised her to go to ED.

## 2024-07-02 NOTE — ED TRIAGE NOTES
PT PRESENTS TO THE ED FOR RED BLOOD PRESENT IN HER BOWELS. PT ENDORSES THAT SHE IS ON BLOOD THINNERS AND STATES THAT SHE HAS HEMORRHOIDS. PT ENDORSES THAT SHE IS ON BLOOD THINNERS DUE TO HAVING A TIA. PT DENIES WEAKNESS. PT WAS TOLD BY PCP TO COME IN FOR BLOOD WORK. PT DENIES FEVERS OR CHILLS. PT DENIES CHEST PAIN OR SOB.

## 2024-07-02 NOTE — PROGRESS NOTES
Pharmacy Medication History Review    Alexandra Love is a 78 y.o. female admitted for No Principal Problem: There is no principal problem currently on the Problem List. Please update the Problem List and refresh.. Pharmacy reviewed the patient's [id number] medications and allergies for accuracy.    The list below reflectives the updated PTA list. Please review each medication in order reconciliation for additional clarification and justification.  Prior to Admission medications    Medication Sig Start Date End Date Taking? Authorizing Provider   amLODIPine (Norvasc) 5 mg tablet Take 1 tablet (5 mg) by mouth once daily. Hold until follow up with PCP due to low blood pressure 6/12/24 9/10/24 Yes Pratima Albert DO   ascorbic acid, vitamin C, 500 mg capsule Take 1 capsule by mouth once daily. 8/11/15  Yes Historical Provider, MD   aspirin 81 mg EC tablet Take 1 tablet (81 mg) by mouth once daily. 3/11/14  Yes Historical Provider, MD   atorvastatin (Lipitor) 80 mg tablet Take 1 tablet (80 mg) by mouth once daily at bedtime. 6/12/24 9/10/24 Yes Pratima Albert DO   clopidogrel (Plavix) 75 mg tablet Take 1 tablet (75 mg) by mouth once daily. 4/17/24 4/17/25 Yes Nj Cantu MD   ergocalciferol, vitamin D2, (CALCIFEROL ORAL) Vitamin D 1000 units caps---take as directed   Yes Historical Provider, MD   ezetimibe (Zetia) 10 mg tablet Take 1 tablet (10 mg) by mouth once daily. 6/12/24  Yes Pratima Albert DO   latanoprost (Xalatan) 0.005 % ophthalmic solution Administer 1 drop into both eyes once daily at bedtime. As directed 7/23/21  Yes Historical Provider, MD   metoprolol tartrate (Lopressor) 25 mg tablet Take 1 tablet (25 mg) by mouth 2 times a day. 6/12/24  Yes Pratima Albert DO   valsartan (Diovan) 160 mg tablet Take 1 tablet (160 mg) by mouth 2 times a day. 6/12/24 12/9/24 Yes Pratima Albert DO   albuterol 90 mcg/actuation inhaler Inhale 1-2 puffs every  6 hours if needed.    Historical Provider, MD   blood pressure monitor (Blood Pressure Kit) kit 1 kit once daily. 5/1/24   ANDREA Burgos-CNP        The list below reflectives the updated allergy list. Please review each documented allergy for additional clarification and justification.  Allergies  Reviewed by Edy Burnham RN on 7/2/2024        Severity Reactions Comments    Other Not Specified Unknown Loop diuretics    Sulfa (sulfonamide Antibiotics) Not Specified Unknown     Sulfamethoxazole Not Specified Other     Sulfonylureas Not Specified Unknown Sulfones    Thiazides Not Specified Unknown Type diuretics            Below are additional concerns with the patient's PTA list.  None    Source: Patient     Waldo Andrew Anthony

## 2024-07-02 NOTE — TELEPHONE ENCOUNTER
Patient called she take a blood thinner and this morning there was blood in her stool should she be concerned about it. Please advise.

## 2024-07-18 ENCOUNTER — LAB (OUTPATIENT)
Dept: LAB | Facility: LAB | Age: 79
End: 2024-07-18
Payer: MEDICARE

## 2024-07-18 ENCOUNTER — APPOINTMENT (OUTPATIENT)
Dept: PRIMARY CARE | Facility: CLINIC | Age: 79
End: 2024-07-18
Payer: MEDICARE

## 2024-07-18 VITALS
HEART RATE: 62 BPM | BODY MASS INDEX: 33.25 KG/M2 | SYSTOLIC BLOOD PRESSURE: 139 MMHG | DIASTOLIC BLOOD PRESSURE: 80 MMHG | TEMPERATURE: 98 F | WEIGHT: 176 LBS | OXYGEN SATURATION: 95 %

## 2024-07-18 DIAGNOSIS — I10 PRIMARY HYPERTENSION: ICD-10-CM

## 2024-07-18 DIAGNOSIS — R73.9 HYPERGLYCEMIA: ICD-10-CM

## 2024-07-18 DIAGNOSIS — J45.20 MILD INTERMITTENT REACTIVE AIRWAY DISEASE WITHOUT COMPLICATION (HHS-HCC): ICD-10-CM

## 2024-07-18 DIAGNOSIS — E78.5 HYPERLIPIDEMIA, UNSPECIFIED HYPERLIPIDEMIA TYPE: ICD-10-CM

## 2024-07-18 DIAGNOSIS — E78.5 HYPERLIPIDEMIA, UNSPECIFIED HYPERLIPIDEMIA TYPE: Primary | ICD-10-CM

## 2024-07-18 DIAGNOSIS — G45.9 TIA (TRANSIENT ISCHEMIC ATTACK): ICD-10-CM

## 2024-07-18 DIAGNOSIS — R10.84 GENERALIZED ABDOMINAL PAIN: ICD-10-CM

## 2024-07-18 DIAGNOSIS — R00.2 PALPITATIONS: ICD-10-CM

## 2024-07-18 DIAGNOSIS — E78.2 MIXED HYPERLIPIDEMIA: ICD-10-CM

## 2024-07-18 DIAGNOSIS — R22.42 MASS OF LEFT THIGH: ICD-10-CM

## 2024-07-18 PROBLEM — J45.909 REACTIVE AIRWAY DISEASE WITHOUT COMPLICATION (HHS-HCC): Status: ACTIVE | Noted: 2024-07-18

## 2024-07-18 PROBLEM — R10.9 ABDOMINAL WALL PAIN: Status: ACTIVE | Noted: 2024-07-18

## 2024-07-18 PROBLEM — R10.9 ABDOMINAL WALL PAIN: Status: RESOLVED | Noted: 2024-07-18 | Resolved: 2024-07-18

## 2024-07-18 LAB
ALBUMIN SERPL BCP-MCNC: 4.3 G/DL (ref 3.4–5)
ALP SERPL-CCNC: 57 U/L (ref 33–136)
ALT SERPL W P-5'-P-CCNC: 19 U/L (ref 7–45)
ANION GAP SERPL CALC-SCNC: 12 MMOL/L (ref 10–20)
AST SERPL W P-5'-P-CCNC: 21 U/L (ref 9–39)
BASOPHILS # BLD AUTO: 0.09 X10*3/UL (ref 0–0.1)
BASOPHILS NFR BLD AUTO: 1.2 %
BILIRUB SERPL-MCNC: 1.3 MG/DL (ref 0–1.2)
BUN SERPL-MCNC: 7 MG/DL (ref 6–23)
CALCIUM SERPL-MCNC: 9.6 MG/DL (ref 8.6–10.6)
CHLORIDE SERPL-SCNC: 105 MMOL/L (ref 98–107)
CHOLEST SERPL-MCNC: 133 MG/DL (ref 0–199)
CHOLESTEROL/HDL RATIO: 2.5
CO2 SERPL-SCNC: 29 MMOL/L (ref 21–32)
CREAT SERPL-MCNC: 0.62 MG/DL (ref 0.5–1.05)
EGFRCR SERPLBLD CKD-EPI 2021: >90 ML/MIN/1.73M*2
EOSINOPHIL # BLD AUTO: 0.38 X10*3/UL (ref 0–0.4)
EOSINOPHIL NFR BLD AUTO: 5.2 %
ERYTHROCYTE [DISTWIDTH] IN BLOOD BY AUTOMATED COUNT: 15.1 % (ref 11.5–14.5)
EST. AVERAGE GLUCOSE BLD GHB EST-MCNC: 117 MG/DL
GLUCOSE SERPL-MCNC: 99 MG/DL (ref 74–99)
HBA1C MFR BLD: 5.7 %
HCT VFR BLD AUTO: 43.9 % (ref 36–46)
HDLC SERPL-MCNC: 52.2 MG/DL
HGB BLD-MCNC: 13.4 G/DL (ref 12–16)
IMM GRANULOCYTES # BLD AUTO: 0.01 X10*3/UL (ref 0–0.5)
IMM GRANULOCYTES NFR BLD AUTO: 0.1 % (ref 0–0.9)
LDLC SERPL CALC-MCNC: 63 MG/DL
LYMPHOCYTES # BLD AUTO: 2.45 X10*3/UL (ref 0.8–3)
LYMPHOCYTES NFR BLD AUTO: 33.6 %
MCH RBC QN AUTO: 26.3 PG (ref 26–34)
MCHC RBC AUTO-ENTMCNC: 30.5 G/DL (ref 32–36)
MCV RBC AUTO: 86 FL (ref 80–100)
MONOCYTES # BLD AUTO: 0.65 X10*3/UL (ref 0.05–0.8)
MONOCYTES NFR BLD AUTO: 8.9 %
NEUTROPHILS # BLD AUTO: 3.71 X10*3/UL (ref 1.6–5.5)
NEUTROPHILS NFR BLD AUTO: 51 %
NON HDL CHOLESTEROL: 81 MG/DL (ref 0–149)
NRBC BLD-RTO: 0 /100 WBCS (ref 0–0)
PLATELET # BLD AUTO: 319 X10*3/UL (ref 150–450)
POTASSIUM SERPL-SCNC: 4.1 MMOL/L (ref 3.5–5.3)
PROT SERPL-MCNC: 7.4 G/DL (ref 6.4–8.2)
RBC # BLD AUTO: 5.1 X10*6/UL (ref 4–5.2)
SODIUM SERPL-SCNC: 142 MMOL/L (ref 136–145)
TRIGL SERPL-MCNC: 90 MG/DL (ref 0–149)
VLDL: 18 MG/DL (ref 0–40)
WBC # BLD AUTO: 7.3 X10*3/UL (ref 4.4–11.3)

## 2024-07-18 PROCEDURE — 1123F ACP DISCUSS/DSCN MKR DOCD: CPT | Performed by: FAMILY MEDICINE

## 2024-07-18 PROCEDURE — 1126F AMNT PAIN NOTED NONE PRSNT: CPT | Performed by: FAMILY MEDICINE

## 2024-07-18 PROCEDURE — 99215 OFFICE O/P EST HI 40 MIN: CPT | Performed by: FAMILY MEDICINE

## 2024-07-18 PROCEDURE — 80053 COMPREHEN METABOLIC PANEL: CPT

## 2024-07-18 PROCEDURE — 80061 LIPID PANEL: CPT

## 2024-07-18 PROCEDURE — 3079F DIAST BP 80-89 MM HG: CPT | Performed by: FAMILY MEDICINE

## 2024-07-18 PROCEDURE — 36415 COLL VENOUS BLD VENIPUNCTURE: CPT

## 2024-07-18 PROCEDURE — 83036 HEMOGLOBIN GLYCOSYLATED A1C: CPT

## 2024-07-18 PROCEDURE — 1159F MED LIST DOCD IN RCRD: CPT | Performed by: FAMILY MEDICINE

## 2024-07-18 PROCEDURE — 3075F SYST BP GE 130 - 139MM HG: CPT | Performed by: FAMILY MEDICINE

## 2024-07-18 PROCEDURE — 85025 COMPLETE CBC W/AUTO DIFF WBC: CPT

## 2024-07-18 PROCEDURE — 1036F TOBACCO NON-USER: CPT | Performed by: FAMILY MEDICINE

## 2024-07-18 RX ORDER — EZETIMIBE 10 MG/1
10 TABLET ORAL DAILY
Qty: 90 TABLET | Refills: 0 | Status: SHIPPED | OUTPATIENT
Start: 2024-07-18

## 2024-07-18 RX ORDER — ATORVASTATIN CALCIUM 80 MG/1
80 TABLET, FILM COATED ORAL NIGHTLY
Qty: 90 TABLET | Refills: 0 | Status: SHIPPED | OUTPATIENT
Start: 2024-07-18 | End: 2024-10-16

## 2024-07-18 RX ORDER — ALBUTEROL SULFATE 90 UG/1
1-2 AEROSOL, METERED RESPIRATORY (INHALATION) EVERY 6 HOURS PRN
Qty: 18 G | Refills: 1 | Status: SHIPPED | OUTPATIENT
Start: 2024-07-18

## 2024-07-18 RX ORDER — METOPROLOL TARTRATE 25 MG/1
25 TABLET, FILM COATED ORAL 2 TIMES DAILY
Qty: 180 TABLET | Refills: 0 | Status: SHIPPED | OUTPATIENT
Start: 2024-07-18

## 2024-07-18 RX ORDER — VALSARTAN 160 MG/1
160 TABLET ORAL 2 TIMES DAILY
Qty: 180 TABLET | Refills: 1 | Status: SHIPPED | OUTPATIENT
Start: 2024-07-18 | End: 2025-01-14

## 2024-07-18 RX ORDER — AMLODIPINE BESYLATE 5 MG/1
5 TABLET ORAL DAILY
Qty: 90 TABLET | Refills: 0 | Status: SHIPPED | OUTPATIENT
Start: 2024-07-18 | End: 2024-10-16

## 2024-07-18 ASSESSMENT — PAIN SCALES - GENERAL: PAINLEVEL: 0-NO PAIN

## 2024-07-18 NOTE — PROGRESS NOTES
Subjective   Patient ID: Alexandra Love is a 78 y.o. female who presents for Follow-up.    HPI   Patient states the paramedics came to her home twice in April.  They checked her vitals and told her she had anxiety/panic episodes.  With the first episode, she was asleep and woke up breathing heavy and experiencing palpitations/heart racing described as chest pounding.  The next episode she was awake and called them for similar symptoms.  She is asking if there is medication to treat this though she admits she takes several medications and does not want to start another med.  She has not had another problem since.  She has seen cardiology since that time and had holter monitor 4/17/24 which did not show afib, fastest .      Patient is S/P TIA in April 2024.  She continues taking Plavix and baby asa prescribed by Dr. Nj Cantu, Cardiologist and prior in the hospital.  She questions whether nor not she can stop it.  Discussed she needs to continue, refilled by dr Cantu for 1 year and discussed most likely plans to keep her on long term    Patient states she went to the ED 7-2-2024 for profuse bright red hematochezia.  She denies hard stools, pain or hemorrhoids at that time and denies any bleeding since.  She had blood work but does not know the results.  They were normal for the most part.  She had a CT of her abdomen that was normal.  She was discharged with no further procedures/testing or medication changes and feels at baseline currently.  Has not sen further blood.  She does take stool softeners off and on, every other day when she uses them and helps her to go daily    Patient complains of new onset of abdominal wall tenderness the last 3 to 4 days. Started on Sunday.  has not been doing much since she was hospitalized early in July and did a lot around the house.   She denies any N/V, constipation and has a BM daily. She does admit to pressure/pain when getting up from a supine position,  she feels most when she goes from laying to sitting up in bed.  If she is sitting or laying still no pain.  It is tender when presses on the area R mid abdomen only.  She feels well, normal appetite.  Does not feel ill or sick.  She did not take anything for it.  She continues amlodipine, atorvastatin, Zetia, metoprolol and Plavix.    Pt requesting new albuterol MDI.  She states uses rarely when her allergies act up and the one she has at home has .  Has not used for at least 2 months she thinks    Patient has not eaten today and will have fasting blood work done.    She declines Zoster vaccine.    Review of Systems    Objective   /80 (BP Location: Left arm, Patient Position: Sitting, BP Cuff Size: Adult long)   Pulse 62   Temp 36.7 °C (98 °F) (Temporal)   Wt 79.8 kg (176 lb)   SpO2 95%   BMI 33.25 kg/m²     Physical Exam  Constitutional:       Appearance: Normal appearance.   Neck:      Vascular: No carotid bruit.   Cardiovascular:      Rate and Rhythm: Normal rate and regular rhythm.      Pulses: Normal pulses.      Heart sounds: Normal heart sounds.   Pulmonary:      Effort: Pulmonary effort is normal.      Breath sounds: Normal breath sounds.   Abdominal:      General: Bowel sounds are normal.      Palpations: Abdomen is soft. There is no mass.      Tenderness: There is abdominal tenderness. There is no guarding or rebound.      Comments: Pt has tenderness R of midline of abdomen area approx 3 - 4 cm in diameter, remainder of abd not tender.  No rebound or guarding.  Normal BS.  No palpable defect or hernia   Musculoskeletal:         General: Normal range of motion.      Cervical back: Normal range of motion.      Right lower leg: No edema.      Left lower leg: No edema.   Skin:     General: Skin is warm and dry.   Neurological:      Mental Status: She is alert and oriented to person, place, and time.      Gait: Gait normal.   Psychiatric:         Mood and Affect: Mood normal.         Behavior:  "Behavior normal.         Thought Content: Thought content normal.         Judgment: Judgment normal.         Assessment/Plan   Problem List Items Addressed This Visit             ICD-10-CM    Abdominal pain R10.9     She complains of abdominal wall pain.  This is likely muscular in nature due to rising from a supine position.    I recommend applying moist heat and Tylenol as needed.           Hyperglycemia R73.9    Relevant Orders    Hemoglobin A1C    Hyperlipidemia - Primary E78.5     She will have blood work to recheck levels.  We will call with results.           Relevant Medications    valsartan (Diovan) 160 mg tablet    ezetimibe (Zetia) 10 mg tablet    Other Relevant Orders    Comprehensive Metabolic Panel    Lipid Panel    Hypertension I10     BP is slightly elevated today.    She will continue her medications and refills were sent in today to pharmacy.         Relevant Orders    CBC and Auto Differential    Palpitations R00.2     She had 2 episodes where EMS was called to her home for what they deemed to be panic attacks.  We discussed cause and possible triggers for her \"panic attack\" and possible future need to rule out cardiac etiology if symptoms persist or worsen.   She complains of persistent chest pressure and palpitations x3 to 4 days.  BP is slightly elevated but not terribly so.    She will monitor her symptoms and defers anxiety medication today pending reevaluation in 6 months.         Relevant Medications    metoprolol tartrate (Lopressor) 25 mg tablet    Mass of left thigh R22.42    Relevant Medications    amLODIPine (Norvasc) 5 mg tablet    TIA (transient ischemic attack) G45.9     She sees Dr. Cantu, Cardiology, treated with Plavix.    Discussed this will likely be long-term as managed by Dr. Cantu.         Relevant Medications    atorvastatin (Lipitor) 80 mg tablet    Reactive airway disease without complication ([facility]) J45.909     Start albuterol 2 puffs every 6 hours as needed " for SOB, cough or wheezing.         Relevant Medications    albuterol 90 mcg/actuation inhaler        By signing my name below, I, Chapin Oates, attest that this documentation has been prepared under the direction and in the presence of Pratima Albert DO.  All medical record entries made by the Scribe were at my direction and personally dictated by me. I have reviewed the chart and agree that the record accurately reflects my personal performance of the history, physical exam, discussion and plan.

## 2024-07-18 NOTE — PATIENT INSTRUCTIONS
Complete blood work to evaluate blood count, chemistry and cholesterol recheck.  We will call with results.    Start albuterol inhaler 1-2 puffs every 4 hours as needed for shortness of breath, wheeze or cough.    For abdominal wall pain, apply moist heat and Tylenol as needed.  Return in a week if pain, constipation or nausea/vomiting start or persist.     Continue Plavix and other medications as directed.

## 2024-07-18 NOTE — ASSESSMENT & PLAN NOTE
BP is slightly elevated today.  Recheck improved    She will continue her medications and refills were sent in today to pharmacy.

## 2024-07-18 NOTE — ASSESSMENT & PLAN NOTE
She complains of abdominal wall pain.  This is likely muscular in nature and has when sitting up from supine position etc.    I recommend applying moist heat and Tylenol as needed.  Follow up if s/s worsen or change or if does not resolve.  Reviewed CT of abd/pelvis from 2 weeks ago

## 2024-07-18 NOTE — ASSESSMENT & PLAN NOTE
"She had 2 episodes where EMS was called to her home for what they deemed to be panic attacks.  We discussed cause and possible triggers for her \"panic attack\" and possible need to rule out cardiac etiology which was done by cardiology.  Had holter monitor and Echo etc.  She will let me know if s/s reoccur         She will monitor her symptoms and defers anxiety medication today pending reevaluation in 6 months or sooner if needed.  So far no further problems.  "

## 2024-07-18 NOTE — ASSESSMENT & PLAN NOTE
She sees Dr. Cantu, Cardiology, treated with Plavix.    Discussed this will likely be long-term as managed by Dr. Cantu.

## 2024-07-18 NOTE — PROGRESS NOTES
Subjective   Patient ID: Alexandra Love is a 78 y.o. female who presents for Follow-up.    HPI   Patient states the paramedics came to her home twice in April.  They checked her vitals and told her she had anxiety/panic episodes.  With the first episode, she was asleep and woke up breathing heavy and experiencing palpitations/heart racing described as chest pounding.  The next episode she was awake and called them for similar symptoms.  She is asking if there is medication to treat this though she admits she takes several medications and does not want to start another med.  She has not had another problem since.  She has seen cardiology since that time and had holter monitor 4/17/24 which did not show afib, fastest .      Patient is S/P TIA in April 2024.  She continues taking Plavix and baby asa prescribed by Dr. Nj Cantu, Cardiologist and prior in the hospital.  She questions whether nor not she can stop it.  Discussed she needs to continue, refilled by dr Cantu for 1 year and discussed most likely plans to keep her on long term    Patient states she went to the ED 7-2-2024 for profuse bright red hematochezia.  She denies hard stools, pain or hemorrhoids at that time and denies any bleeding since.  She had blood work but does not know the results.  They were normal for the most part.  She had a CT of her abdomen that was normal.  She was discharged with no further procedures/testing or medication changes and feels at baseline currently.  Has not sen further blood.  She does take stool softeners off and on, every other day when she uses them and helps her to go daily    Patient complains of new onset of abdominal wall tenderness the last 3 to 4 days. Started on Sunday.  has not been doing much since she was hospitalized early in July and did a lot around the house.   She denies any N/V, constipation and has a BM daily. She does admit to pressure/pain when getting up from a supine position,  she feels most when she goes from laying to sitting up in bed.  If she is sitting or laying still no pain.  It is tender when presses on the area R mid abdomen only.  She feels well, normal appetite.  Does not feel ill or sick.  She did not take anything for it.  She continues amlodipine, atorvastatin, Zetia, metoprolol and Plavix.    Pt requesting new albuterol MDI.  She states uses rarely when her allergies act up and the one she has at home has .  Has not used for at least 2 months she thinks    Patient has not eaten today and will have fasting blood work done.    She declines Zoster vaccine.    Review of Systems    Objective   /80 (BP Location: Left arm, Patient Position: Sitting, BP Cuff Size: Adult long)   Pulse 62   Temp 36.7 °C (98 °F) (Temporal)   Wt 79.8 kg (176 lb)   SpO2 95%   BMI 33.25 kg/m²     Physical Exam  Constitutional:       Appearance: Normal appearance.   Neck:      Vascular: No carotid bruit.   Cardiovascular:      Rate and Rhythm: Normal rate and regular rhythm.      Pulses: Normal pulses.      Heart sounds: Normal heart sounds.   Pulmonary:      Effort: Pulmonary effort is normal.      Breath sounds: Normal breath sounds.   Abdominal:      General: Bowel sounds are normal.      Palpations: Abdomen is soft. There is no mass.      Tenderness: There is abdominal tenderness. There is no guarding or rebound.      Comments: Pt has tenderness R of midline of abdomen area approx 3 - 4 cm in diameter, remainder of abd not tender.  No rebound or guarding.  Normal BS.  No palpable defect or hernia   Musculoskeletal:         General: Normal range of motion.      Cervical back: Normal range of motion.      Right lower leg: No edema.      Left lower leg: No edema.   Skin:     General: Skin is warm and dry.   Neurological:      Mental Status: She is alert and oriented to person, place, and time.      Gait: Gait normal.   Psychiatric:         Mood and Affect: Mood normal.         Behavior:  "Behavior normal.         Thought Content: Thought content normal.         Judgment: Judgment normal.         Assessment/Plan   Problem List Items Addressed This Visit             ICD-10-CM    Abdominal pain R10.9     She complains of abdominal wall pain.  This is likely muscular in nature due to rising from a supine position.    I recommend applying moist heat and Tylenol as needed.           Hyperglycemia R73.9    Relevant Orders    Hemoglobin A1C (Completed)    Hyperlipidemia - Primary E78.5     She will have blood work to recheck levels.  We will call with results.           Relevant Medications    valsartan (Diovan) 160 mg tablet    ezetimibe (Zetia) 10 mg tablet    Other Relevant Orders    Comprehensive Metabolic Panel (Completed)    Lipid Panel (Completed)    Hypertension I10     BP is slightly elevated today.  Recheck improved    She will continue her medications and refills were sent in today to pharmacy.         Relevant Orders    CBC and Auto Differential (Completed)    Palpitations R00.2     She had 2 episodes where EMS was called to her home for what they deemed to be panic attacks.  We discussed cause and possible triggers for her \"panic attack\" and possible need to rule out cardiac etiology which was done by cardiology.  Had holter monitor and Echo etc.  She will let me know if s/s reoccur         She will monitor her symptoms and defers anxiety medication today pending reevaluation in 6 months or sooner if needed.  So far no further problems.         Relevant Medications    metoprolol tartrate (Lopressor) 25 mg tablet    Mass of left thigh R22.42    Relevant Medications    amLODIPine (Norvasc) 5 mg tablet    TIA (transient ischemic attack) G45.9     She sees Dr. Cantu, Cardiology, treated with Plavix.    Discussed this will likely be long-term as managed by Dr. Cantu.         Relevant Medications    atorvastatin (Lipitor) 80 mg tablet    Reactive airway disease without complication ([facility]) " J45.909     Start albuterol 2 puffs every 6 hours as needed for SOB, cough or wheezing.         Relevant Medications    albuterol 90 mcg/actuation inhaler        By signing my name below, I, Chapin Oates, attest that this documentation has been prepared under the direction and in the presence of Pratima Albert DO.  All medical record entries made by the Saray[facility] were at my direction and personally dictated by me. I have reviewed the chart and agree that the record accurately reflects my personal performance of the history, physical exam, discussion and plan.

## 2024-08-21 LAB
ALBUMIN SERPL BCP-MCNC: 4.2 G/DL (ref 3.4–5)
ALBUMIN SERPL BCP-MCNC: 4.5 G/DL (ref 3.4–5)
ALP SERPL-CCNC: 50 U/L (ref 33–136)
ALP SERPL-CCNC: 61 U/L (ref 33–136)
ALT SERPL W P-5'-P-CCNC: 18 U/L (ref 7–45)
ALT SERPL W P-5'-P-CCNC: 25 U/L (ref 7–45)
ANION GAP SERPL CALC-SCNC: 11 MMOL/L (ref 10–20)
ANION GAP SERPL CALC-SCNC: 11 MMOL/L (ref 10–20)
ANION GAP SERPL CALC-SCNC: 13 MMOL/L (ref 10–20)
ANION GAP SERPL CALC-SCNC: 14 MMOL/L (ref 10–20)
ANION GAP SERPL CALC-SCNC: 22 MMOL/L (ref 10–20)
AST SERPL W P-5'-P-CCNC: 24 U/L (ref 9–39)
AST SERPL W P-5'-P-CCNC: 24 U/L (ref 9–39)
BILIRUB SERPL-MCNC: 1 MG/DL (ref 0–1.2)
BILIRUB SERPL-MCNC: 1.3 MG/DL (ref 0–1.2)
BUN SERPL-MCNC: 11 MG/DL (ref 6–23)
BUN SERPL-MCNC: 13 MG/DL (ref 6–23)
BUN SERPL-MCNC: 8 MG/DL (ref 6–23)
BUN SERPL-MCNC: 8 MG/DL (ref 6–23)
BUN SERPL-MCNC: 9 MG/DL (ref 6–23)
CALCIUM SERPL-MCNC: 10.1 MG/DL (ref 8.6–10.3)
CALCIUM SERPL-MCNC: 6.6 MG/DL (ref 8.6–10.3)
CALCIUM SERPL-MCNC: 7.8 MG/DL (ref 8.6–10.3)
CALCIUM SERPL-MCNC: 9.4 MG/DL (ref 8.6–10.3)
CALCIUM SERPL-MCNC: 9.5 MG/DL (ref 8.6–10.3)
CHLORIDE SERPL-SCNC: 101 MMOL/L (ref 98–107)
CHLORIDE SERPL-SCNC: 105 MMOL/L (ref 98–107)
CHLORIDE SERPL-SCNC: 106 MMOL/L (ref 98–107)
CHLORIDE SERPL-SCNC: 111 MMOL/L (ref 98–107)
CHLORIDE SERPL-SCNC: 116 MMOL/L (ref 98–107)
CHOLEST SERPL-MCNC: 199 MG/DL (ref 0–199)
CHOLESTEROL/HDL RATIO: 3.5
CO2 SERPL-SCNC: 17 MMOL/L (ref 21–32)
CO2 SERPL-SCNC: 19 MMOL/L (ref 21–32)
CO2 SERPL-SCNC: 20 MMOL/L (ref 21–32)
CO2 SERPL-SCNC: 23 MMOL/L (ref 21–32)
CO2 SERPL-SCNC: 26 MMOL/L (ref 21–32)
CREAT SERPL-MCNC: 0.51 MG/DL (ref 0.5–1.05)
CREAT SERPL-MCNC: 0.55 MG/DL (ref 0.5–1.05)
CREAT SERPL-MCNC: 0.69 MG/DL (ref 0.5–1.05)
CREAT SERPL-MCNC: 0.75 MG/DL (ref 0.5–1.05)
CREAT SERPL-MCNC: 0.78 MG/DL (ref 0.5–1.05)
EGFRCR SERPLBLD CKD-EPI 2021: 79 ML/MIN/1.73M*2
EGFRCR SERPLBLD CKD-EPI 2021: 83 ML/MIN/1.73M*2
EGFRCR SERPLBLD CKD-EPI 2021: 90 ML/MIN/1.73M*2
EGFRCR SERPLBLD CKD-EPI 2021: >90 ML/MIN/1.73M*2
EGFRCR SERPLBLD CKD-EPI 2021: >90 ML/MIN/1.73M*2
GLUCOSE SERPL-MCNC: 104 MG/DL (ref 74–99)
GLUCOSE SERPL-MCNC: 106 MG/DL (ref 74–99)
GLUCOSE SERPL-MCNC: 113 MG/DL (ref 74–99)
GLUCOSE SERPL-MCNC: 119 MG/DL (ref 74–99)
GLUCOSE SERPL-MCNC: 91 MG/DL (ref 74–99)
HDLC SERPL-MCNC: 57.1 MG/DL
LDLC SERPL CALC-MCNC: 122 MG/DL
NON HDL CHOLESTEROL: 142 MG/DL (ref 0–149)
POTASSIUM SERPL-SCNC: 2.6 MMOL/L (ref 3.5–5.3)
POTASSIUM SERPL-SCNC: 3.6 MMOL/L (ref 3.5–5.3)
POTASSIUM SERPL-SCNC: 3.9 MMOL/L (ref 3.5–5.3)
POTASSIUM SERPL-SCNC: 3.9 MMOL/L (ref 3.5–5.3)
POTASSIUM SERPL-SCNC: 6.5 MMOL/L (ref 3.5–5.3)
PROT SERPL-MCNC: 7.6 G/DL (ref 6.4–8.2)
PROT SERPL-MCNC: 8.3 G/DL (ref 6.4–8.2)
SODIUM SERPL-SCNC: 137 MMOL/L (ref 136–145)
SODIUM SERPL-SCNC: 138 MMOL/L (ref 136–145)
SODIUM SERPL-SCNC: 139 MMOL/L (ref 136–145)
SODIUM SERPL-SCNC: 139 MMOL/L (ref 136–145)
SODIUM SERPL-SCNC: 140 MMOL/L (ref 136–145)
TRIGL SERPL-MCNC: 98 MG/DL (ref 0–149)
VLDL: 20 MG/DL (ref 0–40)

## 2024-08-30 DIAGNOSIS — G45.9 TIA (TRANSIENT ISCHEMIC ATTACK): ICD-10-CM

## 2024-08-30 DIAGNOSIS — E78.2 MIXED HYPERLIPIDEMIA: ICD-10-CM

## 2024-08-30 RX ORDER — EZETIMIBE 10 MG/1
10 TABLET ORAL DAILY
Qty: 90 TABLET | Refills: 1 | Status: SHIPPED | OUTPATIENT
Start: 2024-08-30

## 2024-08-30 RX ORDER — ATORVASTATIN CALCIUM 80 MG/1
80 TABLET, FILM COATED ORAL NIGHTLY
Qty: 90 TABLET | Refills: 1 | Status: SHIPPED | OUTPATIENT
Start: 2024-08-30

## 2024-08-30 RX ORDER — VALSARTAN 160 MG/1
160 TABLET ORAL 2 TIMES DAILY
Qty: 180 TABLET | Refills: 1 | Status: SHIPPED | OUTPATIENT
Start: 2024-08-30 | End: 2025-02-26

## 2024-09-09 ENCOUNTER — TELEPHONE (OUTPATIENT)
Dept: PRIMARY CARE | Facility: CLINIC | Age: 79
End: 2024-09-09
Payer: MEDICARE

## 2024-09-17 ENCOUNTER — OFFICE VISIT (OUTPATIENT)
Dept: CARDIOLOGY | Facility: HOSPITAL | Age: 79
End: 2024-09-17
Payer: MEDICARE

## 2024-09-17 VITALS
HEIGHT: 62 IN | SYSTOLIC BLOOD PRESSURE: 155 MMHG | BODY MASS INDEX: 32.22 KG/M2 | HEART RATE: 75 BPM | WEIGHT: 175.1 LBS | DIASTOLIC BLOOD PRESSURE: 85 MMHG

## 2024-09-17 DIAGNOSIS — R06.02 SHORTNESS OF BREATH: Primary | ICD-10-CM

## 2024-09-17 LAB
ATRIAL RATE: 75 BPM
P AXIS: 34 DEGREES
P OFFSET: 186 MS
P ONSET: 123 MS
PR INTERVAL: 198 MS
Q ONSET: 222 MS
QRS COUNT: 13 BEATS
QRS DURATION: 86 MS
QT INTERVAL: 416 MS
QTC CALCULATION(BAZETT): 464 MS
QTC FREDERICIA: 447 MS
R AXIS: -5 DEGREES
T AXIS: 19 DEGREES
T OFFSET: 430 MS
VENTRICULAR RATE: 75 BPM

## 2024-09-17 PROCEDURE — 1160F RVW MEDS BY RX/DR IN RCRD: CPT | Performed by: INTERNAL MEDICINE

## 2024-09-17 PROCEDURE — 93005 ELECTROCARDIOGRAM TRACING: CPT | Performed by: INTERNAL MEDICINE

## 2024-09-17 PROCEDURE — 99214 OFFICE O/P EST MOD 30 MIN: CPT | Mod: 25 | Performed by: INTERNAL MEDICINE

## 2024-09-17 PROCEDURE — 3077F SYST BP >= 140 MM HG: CPT | Performed by: INTERNAL MEDICINE

## 2024-09-17 PROCEDURE — 99214 OFFICE O/P EST MOD 30 MIN: CPT | Performed by: INTERNAL MEDICINE

## 2024-09-17 PROCEDURE — 1159F MED LIST DOCD IN RCRD: CPT | Performed by: INTERNAL MEDICINE

## 2024-09-17 PROCEDURE — 1036F TOBACCO NON-USER: CPT | Performed by: INTERNAL MEDICINE

## 2024-09-17 PROCEDURE — 93010 ELECTROCARDIOGRAM REPORT: CPT | Performed by: INTERNAL MEDICINE

## 2024-09-17 PROCEDURE — 1123F ACP DISCUSS/DSCN MKR DOCD: CPT | Performed by: INTERNAL MEDICINE

## 2024-09-17 PROCEDURE — 3079F DIAST BP 80-89 MM HG: CPT | Performed by: INTERNAL MEDICINE

## 2024-09-17 RX ORDER — SPIRONOLACTONE 25 MG/1
25 TABLET ORAL DAILY
Qty: 90 TABLET | Refills: 3 | Status: SHIPPED | OUTPATIENT
Start: 2024-09-17 | End: 2025-09-17

## 2024-09-17 NOTE — PROGRESS NOTES
Subjective:  Patient returns for a routine 4-month follow-up.  We follow her for hypertension and hyperlipidemia.  She also had an apparent TIA earlier this year and is currently on DAPT.    She is still having some limited spells of palpitations but nothing sustained or prolonged.  Her monitor generally looked reasonably good with some limited SVT but no atrial fibrillation.    She remains compliant with her antihypertensive therapy but still has some elevated readings.  She also continues to have some ongoing exertional dyspnea.    She has not had any hospitalizations.  She denies any other new health concerns.  She is taking all her medications compliantly and is tolerating them well without side effect.    Objective:  General: Alert, usual pleasant self.  HEENT: Unchanged.  Lungs: Clear without crackles or wheezing.  Cardiac: Distant heart tones without change.  Abdomen: Nontender.  Extremities: No edema.  Skin: No acute rash.  Neuro: Grossly unchanged.    EKG: Normal sinus rhythm.  Within normal limits.    Lipid panel: Cholesterol-133, HDL-52, LDL-63, TG-90.    Impression/plan:  Patient is generally doing reasonably well at this time from a symptom standpoint.  Her palpitations are not overly problematic.  I thus will continue her metoprolol dosing unchanged for now.    Her blood pressure is a bit less than ideal.  I will plan on initiating spironolactone at 25 mg daily.  I will plan on rechecking a BMP in about 2 weeks, and we will review the results with her by phone.  I will see her back in a month or 2.    I was delighted to see that her lipid panel has improved to an excellent range with the addition of ezetimibe.  We will thus continue her atorvastatin and ezetimibe unchanged for now.    I did not think we needed to embark on any additional testing at this time.  She knows to call for any intercurrent problems before her next visit.    Patient instructions:    Begin spironolactone at 25 mg  daily.    Continue other medications unchanged.    Return to clinic in 2 months.    Obtain repeat blood work in 2 weeks and call for results.

## 2024-10-17 DIAGNOSIS — G45.9 TIA (TRANSIENT ISCHEMIC ATTACK): ICD-10-CM

## 2024-10-17 DIAGNOSIS — R22.42 MASS OF LEFT THIGH: ICD-10-CM

## 2024-10-17 RX ORDER — CLOPIDOGREL BISULFATE 75 MG/1
75 TABLET ORAL DAILY
Qty: 90 TABLET | Refills: 0 | Status: SHIPPED | OUTPATIENT
Start: 2024-10-17

## 2024-10-17 RX ORDER — AMLODIPINE BESYLATE 5 MG/1
5 TABLET ORAL DAILY
Qty: 90 TABLET | Refills: 0 | Status: SHIPPED | OUTPATIENT
Start: 2024-10-17 | End: 2025-01-15

## 2024-10-30 ENCOUNTER — HOSPITAL ENCOUNTER (EMERGENCY)
Facility: HOSPITAL | Age: 79
Discharge: HOME | End: 2024-10-30
Attending: STUDENT IN AN ORGANIZED HEALTH CARE EDUCATION/TRAINING PROGRAM
Payer: MEDICARE

## 2024-10-30 VITALS
HEIGHT: 61 IN | TEMPERATURE: 98.4 F | OXYGEN SATURATION: 98 % | WEIGHT: 175 LBS | RESPIRATION RATE: 17 BRPM | BODY MASS INDEX: 33.04 KG/M2 | HEART RATE: 88 BPM | SYSTOLIC BLOOD PRESSURE: 143 MMHG | DIASTOLIC BLOOD PRESSURE: 92 MMHG

## 2024-10-30 DIAGNOSIS — R04.0 EPISTAXIS: Primary | ICD-10-CM

## 2024-10-30 PROCEDURE — 99282 EMERGENCY DEPT VISIT SF MDM: CPT

## 2024-10-30 PROCEDURE — 99281 EMR DPT VST MAYX REQ PHY/QHP: CPT

## 2024-10-30 ASSESSMENT — PAIN SCALES - GENERAL: PAINLEVEL_OUTOF10: 0 - NO PAIN

## 2024-10-30 ASSESSMENT — PAIN - FUNCTIONAL ASSESSMENT: PAIN_FUNCTIONAL_ASSESSMENT: 0-10

## 2024-10-30 ASSESSMENT — PAIN DESCRIPTION - PROGRESSION: CLINICAL_PROGRESSION: NOT CHANGED

## 2024-10-30 ASSESSMENT — COLUMBIA-SUICIDE SEVERITY RATING SCALE - C-SSRS
2. HAVE YOU ACTUALLY HAD ANY THOUGHTS OF KILLING YOURSELF?: NO
1. IN THE PAST MONTH, HAVE YOU WISHED YOU WERE DEAD OR WISHED YOU COULD GO TO SLEEP AND NOT WAKE UP?: NO
6. HAVE YOU EVER DONE ANYTHING, STARTED TO DO ANYTHING, OR PREPARED TO DO ANYTHING TO END YOUR LIFE?: NO

## 2024-11-16 ENCOUNTER — OFFICE VISIT (OUTPATIENT)
Dept: URGENT CARE | Age: 79
End: 2024-11-16
Payer: MEDICARE

## 2024-11-16 VITALS
WEIGHT: 175 LBS | OXYGEN SATURATION: 98 % | SYSTOLIC BLOOD PRESSURE: 155 MMHG | HEART RATE: 78 BPM | DIASTOLIC BLOOD PRESSURE: 94 MMHG | BODY MASS INDEX: 33.07 KG/M2

## 2024-11-16 DIAGNOSIS — R06.02 SOB (SHORTNESS OF BREATH): Primary | ICD-10-CM

## 2024-11-16 ASSESSMENT — ENCOUNTER SYMPTOMS: SHORTNESS OF BREATH: 1

## 2024-11-16 NOTE — PROGRESS NOTES
Subjective   Patient ID: Alexandra Love is a 78 y.o. female. They present today with a chief complaint of Other (Patient complains of shortness of breath and being light headed . ).    History of Present Illness  Patient is a 78-year-old female with history of recent TIA and atrial fibrillation who presents urgent care today with her granddaughter for a complaint of sudden onset shortness of breath.  She states she was walking around at [employer] when she became very anxious and short of breath.  She notes this has been happening to her ever since her TIA several months ago.  She was recently evaluated in the emergency room for similar symptoms and provided with inhalers to use as needed.  She denies any chest pain, numbness, tingling, weakness, slurred speech or any other neurological symptoms.  No other complaints or concerns mention at this time.      History provided by:  Patient and relative      Past Medical History  Allergies as of 11/16/2024 - Reviewed 11/16/2024   Allergen Reaction Noted    Other Unknown 02/23/2023    Sulfa (sulfonamide antibiotics) Unknown 02/23/2023    Sulfamethoxazole Other 09/07/2023    Sulfonylureas Unknown 02/23/2023    Thiazides Unknown 02/23/2023       (Not in a hospital admission)         Past Medical History:   Diagnosis Date    Colon polyp     Delayed emergence from general anesthesia     Dysuria 01/04/2016    Burning with urination    Encounter for examination of blood pressure without abnormal findings 06/25/2015    Blood pressure check    Encounter for examination of blood pressure without abnormal findings 06/11/2015    Blood pressure check    Hyperlipidemia     Hypertension     Other conditions influencing health status 05/14/2015    Follow-up arranged    Personal history of colonic polyps 04/03/2018    History of colon polyps    Personal history of other diseases of the circulatory system 05/23/2018    History of hypertension    Personal history of other diseases of the  circulatory system 2016    History of hypertension    Rash and other nonspecific skin eruption 2022    Rash    Unspecified abdominal pain 2018    Abdominal pain       Past Surgical History:   Procedure Laterality Date     SECTION, LOW TRANSVERSE      TOTAL ABDOMINAL HYSTERECTOMY W/ BILATERAL SALPINGOOPHORECTOMY  2014    Total Abdominal Hysterectomy With Removal Of Both Ovaries        reports that she has never smoked. She has never used smokeless tobacco. She reports that she does not currently use alcohol. She reports that she does not currently use drugs.    Review of Systems  Review of Systems   Respiratory:  Positive for shortness of breath.                                   Objective    Vitals:    24 1417   BP: (!) 155/94   BP Location: Right arm   Patient Position: Sitting   Pulse: 78   SpO2: 98%   Weight: 79.4 kg (175 lb)     No LMP recorded. Patient has had a hysterectomy.    Physical Exam  Vitals and nursing note reviewed.   Constitutional:       General: She is not in acute distress.     Appearance: Normal appearance. She is not ill-appearing, toxic-appearing or diaphoretic.   HENT:      Head: Normocephalic and atraumatic.      Mouth/Throat:      Mouth: Mucous membranes are moist.   Eyes:      Extraocular Movements: Extraocular movements intact.      Conjunctiva/sclera: Conjunctivae normal.      Pupils: Pupils are equal, round, and reactive to light.   Cardiovascular:      Rate and Rhythm: Normal rate and regular rhythm.      Pulses: Normal pulses.      Heart sounds: Normal heart sounds.   Pulmonary:      Effort: Pulmonary effort is normal. No respiratory distress.      Breath sounds: Normal breath sounds. No stridor. No wheezing, rhonchi or rales.   Chest:      Chest wall: No tenderness.   Musculoskeletal:         General: Normal range of motion.      Cervical back: Normal range of motion and neck supple.   Skin:     General: Skin is warm and dry.      Capillary  Refill: Capillary refill takes less than 2 seconds.   Neurological:      General: No focal deficit present.      Mental Status: She is alert and oriented to person, place, and time.   Psychiatric:         Mood and Affect: Mood normal.         Behavior: Behavior normal.         Procedures      Assessment/Plan   Allergies, medications, history, and pertinent labs/EKGs/Imaging reviewed by JP Michaud.     Medical Decision Making  Upon initial assessment patient is alert and oriented.  She is in no acute distress.  She is answer questions appropriately.  She is afebrile appears well-hydrated.  She is slightly hypertensive but vitals are otherwise within normal limits.  She denies any chest pain or shortness of breath at this time.  She states she feels as though her symptoms have improved.  Differential diagnosis includes MI, pulmonary embolism, anxiety, other.  Physical exam is unremarkable and as documented above.  EKG obtained and preliminarily interpreted by myself as normal sinus rhythm at a rate of 75.  No ST elevations or signs of STEMI.  I had a very lengthy discussion with the patient regarding my concern for her symptoms especially given the fact that they were acute.  She is aware that I am unable to definitively rule out a cardiac or pulmonary cause of her symptoms in the urgent care setting.  I recommended given her history, symptoms and advanced age, she be evaluated in the emergency room.  While I did not feel ambulance transport was necessarily required, I did offer this to the patient given she is here with her granddaughter who is not able to drive.  She declined both the ambulance and ER evaluation.  She states she is feeling significantly better and would like to go home and rest.  She states that if her symptoms return she will call an ambulance.  She is aware that choosing not to be evaluated emergency room could result in worsening of symptoms including but not limited to stroke,  pulmonary embolism, MI and death.  She voices understanding and continues to deny further treatment or evaluation.  AMA paperwork obtained.  Patient continues to deny any chest pain, lightheadedness or dizziness.  She was discharged in stable condition, with stable vitals and with expectation that she will go to the emergency room if symptoms return or worsen.    Orders and Diagnoses  There are no diagnoses linked to this encounter.    Medical Admin Record      Follow Up Instructions  No follow-ups on file.    Patient disposition: Home    Electronically signed by [facility]  2:25 PM

## 2024-11-17 NOTE — PATIENT INSTRUCTIONS
You were seen in urgent care today for an acute onset of shortness of breath.  As discussed, this could be a symptom of a serious underlying problem.  It was recommended that you be thoroughly evaluated in the emergency room to rule out any life-threatening cause.  You declined transport via ambulance.  If you change your mind anytime, please do not hesitate to request ambulance transport or other transport to the emergency room especially if your symptoms return, change or worsen in any way.  Choosing not to do so could result in worsening of symptoms up to and including death.

## 2024-12-02 ENCOUNTER — TELEPHONE (OUTPATIENT)
Dept: PRIMARY CARE | Facility: CLINIC | Age: 79
End: 2024-12-02
Payer: MEDICARE

## 2024-12-02 NOTE — TELEPHONE ENCOUNTER
PATIENT CALLED AND LEFT A VOICEMAIL FOR REFILLS ON THE FOLLOWING MEDICATIONS    Medication Name: EZETTIMIBE  Dose: 10MG  Frequency: 1 TAB DAILY  Quantity left:     Medication Name: AMLODIPINE  Dose: 5MG TAB  Frequency: 1 TAB DAILY  Quantity left:    Medication Name: METOPROLOL TARTRATE  Dose: 25MG TAB  Frequency: 1 TAB DAILY  Quantity left:  Pharmacy: [facility]     Last appointment: 7.18.2024  Last CPE:  Last MCW:  Next appointment: 12.30.24  Next CPE:  Next MCW:

## 2024-12-03 DIAGNOSIS — R22.42 MASS OF LEFT THIGH: ICD-10-CM

## 2024-12-03 DIAGNOSIS — R00.2 PALPITATIONS: ICD-10-CM

## 2024-12-03 DIAGNOSIS — E78.2 MIXED HYPERLIPIDEMIA: ICD-10-CM

## 2024-12-03 RX ORDER — EZETIMIBE 10 MG/1
10 TABLET ORAL DAILY
Qty: 30 TABLET | Refills: 0 | Status: SHIPPED | OUTPATIENT
Start: 2024-12-03

## 2024-12-03 RX ORDER — AMLODIPINE BESYLATE 5 MG/1
5 TABLET ORAL DAILY
Qty: 30 TABLET | Refills: 0 | Status: SHIPPED | OUTPATIENT
Start: 2024-12-03 | End: 2025-01-02

## 2024-12-03 RX ORDER — METOPROLOL TARTRATE 25 MG/1
25 TABLET, FILM COATED ORAL 2 TIMES DAILY
Qty: 60 TABLET | Refills: 0 | Status: SHIPPED | OUTPATIENT
Start: 2024-12-03

## 2024-12-27 ENCOUNTER — TELEPHONE (OUTPATIENT)
Dept: PRIMARY CARE | Facility: CLINIC | Age: 79
End: 2024-12-27
Payer: MEDICARE

## 2024-12-30 ENCOUNTER — HOSPITAL ENCOUNTER (EMERGENCY)
Facility: HOSPITAL | Age: 79
Discharge: HOME | End: 2024-12-30
Payer: MEDICARE

## 2024-12-30 ENCOUNTER — APPOINTMENT (OUTPATIENT)
Dept: PRIMARY CARE | Facility: CLINIC | Age: 79
End: 2024-12-30
Payer: MEDICARE

## 2024-12-30 VITALS
RESPIRATION RATE: 17 BRPM | SYSTOLIC BLOOD PRESSURE: 176 MMHG | DIASTOLIC BLOOD PRESSURE: 96 MMHG | WEIGHT: 175 LBS | HEIGHT: 62 IN | OXYGEN SATURATION: 98 % | BODY MASS INDEX: 32.2 KG/M2 | HEART RATE: 91 BPM | TEMPERATURE: 99 F

## 2024-12-30 DIAGNOSIS — R04.0 EPISTAXIS: Primary | ICD-10-CM

## 2024-12-30 DIAGNOSIS — G45.9 TIA ON MEDICATION: ICD-10-CM

## 2024-12-30 LAB
ALBUMIN SERPL BCP-MCNC: 4.3 G/DL (ref 3.4–5)
ALP SERPL-CCNC: 63 U/L (ref 33–136)
ALT SERPL W P-5'-P-CCNC: 19 U/L (ref 7–45)
ANION GAP SERPL CALC-SCNC: 12 MMOL/L (ref 10–20)
AST SERPL W P-5'-P-CCNC: 21 U/L (ref 9–39)
BASOPHILS # BLD AUTO: 0.08 X10*3/UL (ref 0–0.1)
BASOPHILS NFR BLD AUTO: 1 %
BILIRUB SERPL-MCNC: 1.6 MG/DL (ref 0–1.2)
BUN SERPL-MCNC: 11 MG/DL (ref 6–23)
CALCIUM SERPL-MCNC: 9.9 MG/DL (ref 8.6–10.3)
CHLORIDE SERPL-SCNC: 104 MMOL/L (ref 98–107)
CO2 SERPL-SCNC: 29 MMOL/L (ref 21–32)
CREAT SERPL-MCNC: 0.71 MG/DL (ref 0.5–1.05)
EGFRCR SERPLBLD CKD-EPI 2021: 88 ML/MIN/1.73M*2
EOSINOPHIL # BLD AUTO: 0.29 X10*3/UL (ref 0–0.4)
EOSINOPHIL NFR BLD AUTO: 3.5 %
ERYTHROCYTE [DISTWIDTH] IN BLOOD BY AUTOMATED COUNT: 14.7 % (ref 11.5–14.5)
GLUCOSE SERPL-MCNC: 109 MG/DL (ref 74–99)
HCT VFR BLD AUTO: 44.5 % (ref 36–46)
HGB BLD-MCNC: 14.2 G/DL (ref 12–16)
IMM GRANULOCYTES # BLD AUTO: 0.02 X10*3/UL (ref 0–0.5)
IMM GRANULOCYTES NFR BLD AUTO: 0.2 % (ref 0–0.9)
LYMPHOCYTES # BLD AUTO: 3.36 X10*3/UL (ref 0.8–3)
LYMPHOCYTES NFR BLD AUTO: 40.4 %
MCH RBC QN AUTO: 26.2 PG (ref 26–34)
MCHC RBC AUTO-ENTMCNC: 31.9 G/DL (ref 32–36)
MCV RBC AUTO: 82 FL (ref 80–100)
MONOCYTES # BLD AUTO: 0.6 X10*3/UL (ref 0.05–0.8)
MONOCYTES NFR BLD AUTO: 7.2 %
NEUTROPHILS # BLD AUTO: 3.96 X10*3/UL (ref 1.6–5.5)
NEUTROPHILS NFR BLD AUTO: 47.7 %
NRBC BLD-RTO: 0 /100 WBCS (ref 0–0)
PLATELET # BLD AUTO: 342 X10*3/UL (ref 150–450)
POTASSIUM SERPL-SCNC: 3.1 MMOL/L (ref 3.5–5.3)
PROT SERPL-MCNC: 8 G/DL (ref 6.4–8.2)
RBC # BLD AUTO: 5.43 X10*6/UL (ref 4–5.2)
SODIUM SERPL-SCNC: 142 MMOL/L (ref 136–145)
WBC # BLD AUTO: 8.3 X10*3/UL (ref 4.4–11.3)

## 2024-12-30 PROCEDURE — 85025 COMPLETE CBC W/AUTO DIFF WBC: CPT

## 2024-12-30 PROCEDURE — 80053 COMPREHEN METABOLIC PANEL: CPT

## 2024-12-30 PROCEDURE — 99283 EMERGENCY DEPT VISIT LOW MDM: CPT

## 2024-12-30 PROCEDURE — 36415 COLL VENOUS BLD VENIPUNCTURE: CPT

## 2024-12-30 PROCEDURE — 2500000001 HC RX 250 WO HCPCS SELF ADMINISTERED DRUGS (ALT 637 FOR MEDICARE OP)

## 2024-12-30 RX ORDER — OXYMETAZOLINE HCL 0.05 %
2 SPRAY, NON-AEROSOL (ML) NASAL ONCE
Status: COMPLETED | OUTPATIENT
Start: 2024-12-30 | End: 2024-12-30

## 2024-12-30 RX ADMIN — OXYMETAZOLINE HYDROCHLORIDE 2 SPRAY: 0.05 SPRAY NASAL at 10:23

## 2024-12-30 ASSESSMENT — PAIN SCALES - GENERAL: PAINLEVEL_OUTOF10: 0 - NO PAIN

## 2024-12-30 ASSESSMENT — PAIN - FUNCTIONAL ASSESSMENT: PAIN_FUNCTIONAL_ASSESSMENT: 0-10

## 2024-12-30 NOTE — ED TRIAGE NOTES
Pt arrives to ED from home with c/o nose bleed that started this morning. Pt states it has been since around 0630 since it started and has been continuously bleeding. Pt denies dizziness or CP. Pt states she does has some SOB associated. Pt is on Plavix for a previous TIA. Pt states she had recent nose bleed that caused her to come to ED in end of October.

## 2024-12-30 NOTE — ED PROVIDER NOTES
HPI   Chief Complaint   Patient presents with    Epistaxis (Nose Bleed)       Pt is a 79 year old female presenting to the ED for concern of epistaxis. Pt states she was awoken from sleep at 6:00 am to a nose bleed coming out of her right nostril. It has been bleeding ever since. She feels blood dripping down her throat and has been spitting up blood. Denies any trauma or precipitating incident. Denies any bleeding or clotting disorder. Pt is currently on Plavix and aspirin for a TIA she had back in April. She states she had a nosebleed like this on 10/30 and presented to [facility] ED. She has a history of hypertension and her BP was elevated in triage. Denies chest pain, SOB, dizziness, and headache. She has not taken her BP meds today, however took her meds when I saw her.           Patient History   Past Medical History:   Diagnosis Date    Colon polyp     Delayed emergence from general anesthesia     Dysuria 2016    Burning with urination    Encounter for examination of blood pressure without abnormal findings 2015    Blood pressure check    Encounter for examination of blood pressure without abnormal findings 2015    Blood pressure check    Hyperlipidemia     Hypertension     Other conditions influencing health status 2015    Follow-up arranged    Personal history of colonic polyps 2018    History of colon polyps    Personal history of other diseases of the circulatory system 2018    History of hypertension    Personal history of other diseases of the circulatory system 2016    History of hypertension    Rash and other nonspecific skin eruption 2022    Rash    Unspecified abdominal pain 2018    Abdominal pain     Past Surgical History:   Procedure Laterality Date     SECTION, LOW TRANSVERSE      TOTAL ABDOMINAL HYSTERECTOMY W/ BILATERAL SALPINGOOPHORECTOMY  2014    Total Abdominal Hysterectomy With Removal Of Both Ovaries     Family History    Problem Relation Name Age of Onset    Ovarian cancer Mother      Cancer Father          gastric cancer    Colon cancer Sister      Diabetes Other      Breast cancer Neg Hx       Social History     Tobacco Use    Smoking status: Never    Smokeless tobacco: Never   Vaping Use    Vaping status: Never Used   Substance Use Topics    Alcohol use: Not Currently    Drug use: Not Currently       Physical Exam   ED Triage Vitals [12/30/24 0829]   Temperature Heart Rate Respirations BP   37.2 °C (99 °F) 91 17 (!) 176/96      Pulse Ox Temp Source Heart Rate Source Patient Position   98 % Temporal Monitor --      BP Location FiO2 (%)     -- --       Physical Exam  Constitutional:       General: She is not in acute distress.     Appearance: She is normal weight. She is not ill-appearing, toxic-appearing or diaphoretic.   HENT:      Head: Normocephalic and atraumatic.      Nose:      Comments: Dried blood and clot in right nostril. Left nostril clear. No obvious site of bleeding.      Mouth/Throat:      Mouth: Mucous membranes are moist.      Comments: Blood in oropharynx.   Cardiovascular:      Rate and Rhythm: Normal rate and regular rhythm.      Pulses: Normal pulses.      Heart sounds: Normal heart sounds. No murmur heard.     No friction rub. No gallop.   Pulmonary:      Effort: Pulmonary effort is normal. No respiratory distress.      Breath sounds: Normal breath sounds. No stridor. No wheezing, rhonchi or rales.   Chest:      Chest wall: No tenderness.   Skin:     General: Skin is warm and dry.      Capillary Refill: Capillary refill takes less than 2 seconds.   Neurological:      General: No focal deficit present.      Mental Status: She is alert and oriented to person, place, and time.   Psychiatric:         Mood and Affect: Mood normal.         Behavior: Behavior normal.           ED Course & MDM   ED Course as of 12/30/24 1020   Mon Dec 30, 2024   0905 Pt is a 79 year old female presenting to ED as described in HPI  for concern of epistaxis. Pt is currently on plavix and aspirin. There is dried blood and clots in right nostril and blood in oropharynx. Pt is applying direct pressure. Will order type and screen and coag studies. Will apply topical oxymetazoline and re assess.  [VS]   0919 Oxymetazoline sprayed in right nostril. Guaze placed in right nostril and clamp applied. Will let pt sit for 10 minutes and re assess.  [VS]   0935 CBC interpreted by me. No evidence of anemia. H and H normal. No leukocytosis.  [VS]   1018 Nosebleed stopped after 2 applications of Afrin nasal spray.  Blood in oropharynx has cleared and there is no obvious bleeding or clots in bilateral naris.  Patient is appropriate for discharge.  Will send the patient home with Afrin and instructions on pressure and Afrin use at home for nosebleeds.  Patient does use a humidifier at home.  Will send Ocean nasal spray as needed for nosebleeds.  Patient is requesting to be taken off of her blood thinning medication.  Discussed with patient the importance for TIA prevention.  Patient referred to neurologist and told to follow-up with neuro and her primary care doctor for further management of blood thinning medication.  Patient given strict return precautions if nosebleeds return or persist. [VS]      ED Course User Index  [VS] Rosa Diaz PA-C         Diagnoses as of 12/30/24 1020   TIA on medication   Epistaxis                 No data recorded     Bebe Coma Scale Score: 15 (12/30/24 0830 : Ben Gibson, GIDEON)                           Medical Decision Making  Chronic Medical Conditions Significantly Affecting Care:      Escalation of Care: Appropriate for outpatient management       Counseling: Spoke with the patient and discussed today´s findings, in addition to providing specific details for the plan of care and expected course.  Patient was given the opportunity to ask questions.    Discussed return precautions and importance of  follow-up.  Advised to follow-up with neurology and primary care physician.  Advised to return to the ED for changing or worsening symptoms, new symptoms, complaint specific precautions, and precautions listed on the discharge paperwork.  Educated on the common potential side effects of medications prescribed.    I advised the patient that the emergency evaluation and treatment provided today doesn't end their need for medical care. It is very important that they follow-up with their primary care provider or other specialist as instructed.    The plan of care was mutually agreed upon with the patient. The patient and/or family were given the opportunity to ask questions. All questions asked today in the ED were answered to the best of my ability with today's information.    I specifically advised the patient to return to the ED for changing or worsening symptoms, worrisome new symptoms, or for any complaint specific precautions listed on the discharge paperwork.      Procedure  Procedures     Rosa Diaz PA-C  12/30/24 1029

## 2024-12-30 NOTE — DISCHARGE INSTRUCTIONS
Can use Ocean nasal spray as needed to help with nosebleeds  If nosebleeds again please apply direct pressure with gauze and clamp for 10 minutes.  If bleeding continues can spray oxymetazoline nasal spray into each nostril and wait 10 minutes.  If bleeding persists please return to ED for further evaluation.  If blood is dripping down back of throat and do not see obvious bleed please return to ED.  Please make an appointment and follow-up with a neurologist and your primary care doctor for further management of blood thinning medications for TIA prevention.

## 2025-01-10 ENCOUNTER — LAB (OUTPATIENT)
Dept: LAB | Facility: LAB | Age: 80
End: 2025-01-10
Payer: MEDICARE

## 2025-01-10 ENCOUNTER — OFFICE VISIT (OUTPATIENT)
Dept: PRIMARY CARE | Facility: CLINIC | Age: 80
End: 2025-01-10
Payer: MEDICARE

## 2025-01-10 VITALS
RESPIRATION RATE: 16 BRPM | BODY MASS INDEX: 31.5 KG/M2 | HEART RATE: 67 BPM | DIASTOLIC BLOOD PRESSURE: 72 MMHG | OXYGEN SATURATION: 97 % | WEIGHT: 171.2 LBS | HEIGHT: 62 IN | TEMPERATURE: 96.8 F | SYSTOLIC BLOOD PRESSURE: 132 MMHG

## 2025-01-10 DIAGNOSIS — I10 PRIMARY HYPERTENSION: ICD-10-CM

## 2025-01-10 DIAGNOSIS — Z79.01 CURRENT USE OF LONG TERM ANTICOAGULATION: ICD-10-CM

## 2025-01-10 DIAGNOSIS — E87.6 LOW SERUM POTASSIUM: ICD-10-CM

## 2025-01-10 DIAGNOSIS — R04.0 EPISTAXIS: Primary | ICD-10-CM

## 2025-01-10 LAB — POTASSIUM SERPL-SCNC: 3.5 MMOL/L (ref 3.5–5.3)

## 2025-01-10 PROCEDURE — 3078F DIAST BP <80 MM HG: CPT | Performed by: FAMILY MEDICINE

## 2025-01-10 PROCEDURE — 84132 ASSAY OF SERUM POTASSIUM: CPT

## 2025-01-10 PROCEDURE — 1160F RVW MEDS BY RX/DR IN RCRD: CPT | Performed by: FAMILY MEDICINE

## 2025-01-10 PROCEDURE — 3075F SYST BP GE 130 - 139MM HG: CPT | Performed by: FAMILY MEDICINE

## 2025-01-10 PROCEDURE — 1123F ACP DISCUSS/DSCN MKR DOCD: CPT | Performed by: FAMILY MEDICINE

## 2025-01-10 PROCEDURE — 99214 OFFICE O/P EST MOD 30 MIN: CPT | Performed by: FAMILY MEDICINE

## 2025-01-10 PROCEDURE — 1159F MED LIST DOCD IN RCRD: CPT | Performed by: FAMILY MEDICINE

## 2025-01-10 NOTE — PATIENT INSTRUCTIONS
Nonfasting labs (potassium).  Apply pressure on flesh part of nose for 10 minutes if nosebleeds occur.  Continue Afrin and Try [place] nasal spray as advised at ER.   Try home humidifier to prevent nosebleeds.  Patient aware that anticoagulants (Aspirin, Plavix, etc) increase risk of bleeding.  Do not take extra doses of Aspirin.    Keep upcoming appointment with neurology.    F/U w/PCP.

## 2025-01-10 NOTE — PROGRESS NOTES
"Subjective   Patient ID: Alexandra Love is a 79 y.o. female who presents for Follow-up (Ed VISIT ).    HPI   PCP: Dr. Henley    Seen at ER 12/30/24 for nosebleed (right nostril).  Currently on ASA and Plavix for prior TIA 9 months ago.  Occ takes extra tab of baby ASA if she gets palpitations.  Nosebleeds only happen in the morning, multiple times/wk.  /96 at ER, but did not take meds that day.  Sent home w/Afrin, [place] nasal spray.  Advised to follow up w/neurology to discuss Tx of TIA (has appt 1/31/25).  Had 2 nosebleeds since ER visit.    Of note:  Potassium low at ER visit.  Needs repeat potassium level.    Review of Systems  No other acute complaints.     Objective   /72   Pulse 67   Temp 36 °C (96.8 °F)   Resp 16   Ht 1.575 m (5' 2\")   Wt 77.7 kg (171 lb 3.2 oz)   SpO2 97%   BMI 31.31 kg/m²     Physical Exam  Constitutional:       General: She is not in acute distress.     Appearance: She is obese.   HENT:      Nose:      Comments: No active nosebleed  Cardiovascular:      Rate and Rhythm: Normal rate and regular rhythm.      Heart sounds: Normal heart sounds. No murmur heard.     No friction rub. No gallop.   Pulmonary:      Effort: Pulmonary effort is normal.      Breath sounds: Normal breath sounds. No wheezing, rhonchi or rales.   Neurological:      Mental Status: She is oriented to person, place, and time.   Psychiatric:         Behavior: Behavior normal.     Assessment/Plan   Diagnoses and all orders for this visit:  Epistaxis  Current use of long term anticoagulation  Primary hypertension  Low serum potassium  -     Potassium; Future    Nonfasting labs (potassium).  Apply pressure on flesh part of nose for 10 minutes if nosebleeds occur.  Continue Afrin and Try [place] nasal spray as advised at ER.   Try home humidifier to prevent nosebleeds.  Patient aware that anticoagulants (Aspirin, Plavix, etc) increase risk of bleeding.  Do not take extra doses of Aspirin.    Keep upcoming " appointment with neurology.    F/U w/PCP.

## 2025-01-17 ENCOUNTER — APPOINTMENT (OUTPATIENT)
Dept: PRIMARY CARE | Facility: CLINIC | Age: 80
End: 2025-01-17
Payer: MEDICARE

## 2025-01-30 ENCOUNTER — TELEPHONE (OUTPATIENT)
Dept: PRIMARY CARE | Facility: CLINIC | Age: 80
End: 2025-01-30
Payer: MEDICARE

## 2025-01-30 NOTE — TELEPHONE ENCOUNTER
CALLED PATIENT TO SCHEDULE MCW LEFT MESSAGE TO CALL OFFICE AND SCHEDULE FOR MAY WHEN PCP IS BACK IN OFFICE LAST MCW [phone]

## 2025-01-31 ENCOUNTER — OFFICE VISIT (OUTPATIENT)
Dept: NEUROLOGY | Facility: CLINIC | Age: 80
End: 2025-01-31
Payer: MEDICARE

## 2025-01-31 VITALS — DIASTOLIC BLOOD PRESSURE: 83 MMHG | HEART RATE: 61 BPM | SYSTOLIC BLOOD PRESSURE: 155 MMHG

## 2025-01-31 DIAGNOSIS — Z86.73 HISTORY OF TIA (TRANSIENT ISCHEMIC ATTACK): ICD-10-CM

## 2025-01-31 DIAGNOSIS — G31.84 MCI (MILD COGNITIVE IMPAIRMENT): Primary | ICD-10-CM

## 2025-01-31 DIAGNOSIS — E78.2 MIXED HYPERLIPIDEMIA: ICD-10-CM

## 2025-01-31 DIAGNOSIS — I10 PRIMARY HYPERTENSION: ICD-10-CM

## 2025-01-31 PROCEDURE — 99215 OFFICE O/P EST HI 40 MIN: CPT | Performed by: PSYCHIATRY & NEUROLOGY

## 2025-01-31 ASSESSMENT — ACTIVITIES OF DAILY LIVING (ADL)
MOBILITY_LEVEL_SURFACES: INDEPENDENT (BUT MAY USE ANY AID FOR EXAMPLE, STICK) > 50 YARDS
DRESSING: INDEPENDENT (INCLUDING BUTTONS, ZIPS, LACES,ETC.)
BATHING: INDEPENDENT (OR IN SHOWER)
BLADDER: CONTINENT
TOTAL_SCORE: 100
GROOMING: INDEPENDENT FACE/HAIR/TEETH.SHAVING (IMPLEMENTS PROVIDED)
BED_TO_CHAIR_AND_BACK: INDEPENDENT
STAIRS: INDEPENDENT
BOWELS: INDEPENDENT (INCLUDING BUTTONS, ZIPS, LACES, ETC.)
TOILET_USE: INDEPENDENT (ON AND OFF, DRESSING, WIPING)
FEEDING: INDEPENDENT

## 2025-01-31 NOTE — PATIENT INSTRUCTIONS
"You were seen in the Neurology clinic for TIA follow-up.   From a TIA perspective you completed the required course of Plavix and can stop that medication but continue taking Aspirin and Atorvastatin.     You do have mild cognitive impairment, that could be suggestive of early dementia. We want to look further into whether this could be early Alzheimer Disease. We would also likely consider you for new treatments for Alzheimer's disease. To learn more about this visit [email or web address] and in the search bar, search for \"Alzheimer's\" and click on the link for New Treatments for Alzheimer's Disease and Dementia, then click on Learn more about Lecanemab for Alzheimer’s.    We will also refer you to Dr. Salter to further evaluate the cognitive impairment  "

## 2025-02-26 ENCOUNTER — TELEPHONE (OUTPATIENT)
Dept: PRIMARY CARE | Facility: CLINIC | Age: 80
End: 2025-02-26
Payer: MEDICARE

## 2025-02-26 DIAGNOSIS — R00.2 PALPITATIONS: ICD-10-CM

## 2025-02-26 DIAGNOSIS — R22.42 MASS OF LEFT THIGH: ICD-10-CM

## 2025-02-26 DIAGNOSIS — E78.2 MIXED HYPERLIPIDEMIA: ICD-10-CM

## 2025-02-26 NOTE — TELEPHONE ENCOUNTER
Medication Name: AMLODIPINE   Dose: 5 MG   Frequency:1 TAB DAILY   Quantity left: 5     Medication Name: VALSARTAN   Dose: 160MG   Frequency: 1 TAB DAILY TWICE DAILY  Quantity left:5     Medication Name:EZETIMBE   Dose: 10 MG   Frequency: 1 TAB DAILY  Quantity left: 5    Medication Name:METOPROLOL  Dose:  25MG   Frequency: 1 TAB TWICE DAILY   Quantity left:  5  Pharmacy: [facility]     Last appointment: 1/10/25  Last CPE:  Last MCW:  Next appointment:  Next CPE:  Next MCW:

## 2025-03-03 ENCOUNTER — APPOINTMENT (OUTPATIENT)
Dept: PRIMARY CARE | Facility: CLINIC | Age: 80
End: 2025-03-03
Payer: MEDICARE

## 2025-03-03 VITALS
BODY MASS INDEX: 32.01 KG/M2 | HEART RATE: 76 BPM | OXYGEN SATURATION: 95 % | DIASTOLIC BLOOD PRESSURE: 72 MMHG | SYSTOLIC BLOOD PRESSURE: 124 MMHG | WEIGHT: 175 LBS

## 2025-03-03 DIAGNOSIS — E78.2 MIXED HYPERLIPIDEMIA: ICD-10-CM

## 2025-03-03 DIAGNOSIS — I10 PRIMARY HYPERTENSION: Primary | ICD-10-CM

## 2025-03-03 DIAGNOSIS — R00.2 PALPITATIONS: ICD-10-CM

## 2025-03-03 PROCEDURE — 99214 OFFICE O/P EST MOD 30 MIN: CPT | Performed by: FAMILY MEDICINE

## 2025-03-03 PROCEDURE — G2211 COMPLEX E/M VISIT ADD ON: HCPCS | Performed by: FAMILY MEDICINE

## 2025-03-03 PROCEDURE — 1159F MED LIST DOCD IN RCRD: CPT | Performed by: FAMILY MEDICINE

## 2025-03-03 PROCEDURE — 1123F ACP DISCUSS/DSCN MKR DOCD: CPT | Performed by: FAMILY MEDICINE

## 2025-03-03 PROCEDURE — 3078F DIAST BP <80 MM HG: CPT | Performed by: FAMILY MEDICINE

## 2025-03-03 PROCEDURE — 3074F SYST BP LT 130 MM HG: CPT | Performed by: FAMILY MEDICINE

## 2025-03-03 PROCEDURE — 1160F RVW MEDS BY RX/DR IN RCRD: CPT | Performed by: FAMILY MEDICINE

## 2025-03-03 PROCEDURE — 1036F TOBACCO NON-USER: CPT | Performed by: FAMILY MEDICINE

## 2025-03-03 RX ORDER — VALSARTAN 160 MG/1
160 TABLET ORAL 2 TIMES DAILY
Qty: 200 TABLET | Refills: 1 | Status: SHIPPED | OUTPATIENT
Start: 2025-03-03

## 2025-03-03 RX ORDER — ATORVASTATIN CALCIUM 80 MG/1
80 TABLET, FILM COATED ORAL NIGHTLY
Qty: 100 TABLET | Refills: 1 | Status: SHIPPED | OUTPATIENT
Start: 2025-03-03

## 2025-03-03 RX ORDER — METOPROLOL TARTRATE 25 MG/1
25 TABLET, FILM COATED ORAL 2 TIMES DAILY
Qty: 200 TABLET | Refills: 1 | Status: SHIPPED | OUTPATIENT
Start: 2025-03-03

## 2025-03-03 RX ORDER — AMLODIPINE BESYLATE 5 MG/1
5 TABLET ORAL DAILY
Qty: 100 TABLET | Refills: 1 | Status: SHIPPED | OUTPATIENT
Start: 2025-03-03

## 2025-03-03 RX ORDER — EZETIMIBE 10 MG/1
10 TABLET ORAL DAILY
Qty: 100 TABLET | Refills: 1 | Status: SHIPPED | OUTPATIENT
Start: 2025-03-03

## 2025-03-03 ASSESSMENT — ENCOUNTER SYMPTOMS
DEPRESSION: 0
LOSS OF SENSATION IN FEET: 0
OCCASIONAL FEELINGS OF UNSTEADINESS: 0

## 2025-03-03 NOTE — PROGRESS NOTES
Subjective   Patient ID: Alexandra Love is a 79 y.o. female who presents for Follow-up (MED REFILL).    HPI   PCP: Dr. Henley    Has HLD, HTN, Palpitations.  Condition(s) stable.  Taking med(s) as directed.  Requests refills    Saw neuro 1/31/25.    Plavix stopped at that time.  Still on ASA.    Spironolactone provided by cardiology.  Does not need Albuterol refilled at this time.    Review of Systems  No other complaints.     Objective   /72   Pulse 76   Wt 79.4 kg (175 lb)   SpO2 95%   BMI 32.01 kg/m²     Physical Exam  Constitutional:       General: She is not in acute distress.     Appearance: She is obese.   Cardiovascular:      Rate and Rhythm: Normal rate and regular rhythm.      Heart sounds: Normal heart sounds. No murmur heard.     No friction rub. No gallop.   Pulmonary:      Effort: Pulmonary effort is normal.      Breath sounds: Normal breath sounds. No wheezing, rhonchi or rales.   Neurological:      Mental Status: She is oriented to person, place, and time.   Psychiatric:         Mood and Affect: Mood normal.         Behavior: Behavior normal.     Assessment/Plan   Diagnoses and all orders for this visit:  Primary hypertension  -     amLODIPine (Norvasc) 5 mg tablet; Take 1 tablet (5 mg) by mouth once daily. Hold until follow up with PCP due to low blood pressure  -     valsartan (Diovan) 160 mg tablet; Take 1 tablet (160 mg) by mouth 2 times a day.  Mixed hyperlipidemia  -     atorvastatin (Lipitor) 80 mg tablet; Take 1 tablet (80 mg) by mouth once daily at bedtime.  -     ezetimibe (Zetia) 10 mg tablet; Take 1 tablet (10 mg) by mouth once daily.  Palpitations  -     metoprolol tartrate (Lopressor) 25 mg tablet; Take 1 tablet (25 mg) by mouth 2 times a day.    Refilled medications.    F/U w/PCP.

## 2025-06-04 ENCOUNTER — OFFICE VISIT (OUTPATIENT)
Dept: CARDIOLOGY | Facility: HOSPITAL | Age: 80
End: 2025-06-04
Payer: MEDICARE

## 2025-06-04 VITALS — SYSTOLIC BLOOD PRESSURE: 146 MMHG | DIASTOLIC BLOOD PRESSURE: 82 MMHG | HEART RATE: 64 BPM

## 2025-06-04 DIAGNOSIS — R00.2 PALPITATIONS: ICD-10-CM

## 2025-06-04 DIAGNOSIS — I10 PRIMARY HYPERTENSION: ICD-10-CM

## 2025-06-04 PROCEDURE — 1160F RVW MEDS BY RX/DR IN RCRD: CPT | Performed by: NURSE PRACTITIONER

## 2025-06-04 PROCEDURE — 1036F TOBACCO NON-USER: CPT | Performed by: NURSE PRACTITIONER

## 2025-06-04 PROCEDURE — 93010 ELECTROCARDIOGRAM REPORT: CPT | Performed by: INTERNAL MEDICINE

## 2025-06-04 PROCEDURE — 3077F SYST BP >= 140 MM HG: CPT | Performed by: NURSE PRACTITIONER

## 2025-06-04 PROCEDURE — 99212 OFFICE O/P EST SF 10 MIN: CPT | Performed by: NURSE PRACTITIONER

## 2025-06-04 PROCEDURE — 93005 ELECTROCARDIOGRAM TRACING: CPT | Performed by: NURSE PRACTITIONER

## 2025-06-04 PROCEDURE — 1159F MED LIST DOCD IN RCRD: CPT | Performed by: NURSE PRACTITIONER

## 2025-06-04 PROCEDURE — 99214 OFFICE O/P EST MOD 30 MIN: CPT | Performed by: NURSE PRACTITIONER

## 2025-06-04 PROCEDURE — 3079F DIAST BP 80-89 MM HG: CPT | Performed by: NURSE PRACTITIONER

## 2025-06-04 ASSESSMENT — ENCOUNTER SYMPTOMS
RESPIRATORY NEGATIVE: 1
CONSTITUTIONAL NEGATIVE: 1
CARDIOVASCULAR NEGATIVE: 1

## 2025-06-04 NOTE — PATIENT INSTRUCTIONS
INCREASE AMLODIPINE TO 5 MG TWICE DAILY    CONTINUE ALL OTHER MEDICATION    CALL FOR ANY CONCERNS    RETURN IN 6 WEEKS

## 2025-06-04 NOTE — PROGRESS NOTES
Subjective   Alexandra Love is a 79 y.o. female.    Chief Complaint:  The diuretic gave me palpitations    HPI  Mrs Love returns for a 2 month BP followup.  She is accompanied by her son.  She did not tolerate spironolactone as it caused her fluttering and palpitations.  She is not taking it.  She is taking her other medication faithfully.  She has acquired a BP cuff and Kardiamobile device.  She has no particular cardiac concerns. She denies any chest pain or shortness of breath.  She is active without concerns and able to perform her own ADLs.  She denies any recent hospitalization or ED visits.  She denies any recurrent neurologic concerns and has come off of Plavix after consultation with neurology.       Review of Systems   Constitutional: Negative.   Cardiovascular: Negative.    Respiratory: Negative.     All other systems reviewed and are negative.      Objective   Gen.: Well-developed, well-nourished in no acute distress.  Eyes: Conjunctivae are pink.  HEENT: Normocephalic, atraumatic. Oral pharynx is clear.  Neck: Supple, JVP is normal. 2+ carotid pulses without bruit.  Pulmonary: Normal respiratory effort, clear to auscultation.  Cardiovascular: Regular rate, normal S1 and S2, 1/6 systolic murmur, rubs or gallops.  Abdomen: Soft, nontender, nondistended.  Extremities: Warm without edema. 2+ radial pulses bilaterally  Musculoskeletal: Normal gait. Normal ROM.  Skin: No rash.  Neurologic: Alert and oriented ×3  Psychiatric: Normal mood and affect    Lab Review:   Lab Results   Component Value Date     12/30/2024    K 3.5 01/10/2025     12/30/2024    CO2 29 12/30/2024    BUN 11 12/30/2024    CREATININE 0.71 12/30/2024    GLUCOSE 109 (H) 12/30/2024    CALCIUM 9.9 12/30/2024     Lab Results   Component Value Date    WBC 8.3 12/30/2024    HGB 14.2 12/30/2024    HCT 44.5 12/30/2024    MCV 82 12/30/2024     12/30/2024     Lab Results   Component Value Date    CHOL 133 07/18/2024    TRIG 90  07/18/2024    HDL 52.2 07/18/2024     ECG obtained and reviewed shows sinus rhythm with 1 degree AVB and VR 64 bpm    Assessment/Plan   Mrs. Love is a pleasant 79 year old  female with a past medical history significant for hypertension, hyperlipidemia and palpitations.  She had a TIA 4/2024.  Echocardiogram 4/3/2024 showed showed an EF of 70 to 75%.   NM stress test 10/2022 showed no evidence of ischemia with normal LVEF.  She presents with an episode of palpitations and have obtained a Kardiamobile device.  She also has a home  BP monitor. It is compared to my manual reading and is very consistent.  ECG is stable and BP is mildly elevated.  After much discussion, she prefers to increase Amlodipine today rather than retry a diuretic.  We will increase amlodipine to 5 mg twice daily and continue all other medication unchanged. She will return in 6 weeks and will bring her BP cuff with readings with her.  In there interim she will call for any problematic edema or recurrent palpitations. Her son knows to call for any concerns as well.

## 2025-06-05 ENCOUNTER — APPOINTMENT (OUTPATIENT)
Dept: PRIMARY CARE | Facility: CLINIC | Age: 80
End: 2025-06-05
Payer: MEDICARE

## 2025-06-05 VITALS
HEART RATE: 62 BPM | HEIGHT: 62 IN | TEMPERATURE: 98 F | OXYGEN SATURATION: 94 % | BODY MASS INDEX: 32.57 KG/M2 | DIASTOLIC BLOOD PRESSURE: 76 MMHG | WEIGHT: 177 LBS | SYSTOLIC BLOOD PRESSURE: 135 MMHG

## 2025-06-05 DIAGNOSIS — I10 ESSENTIAL HYPERTENSION: ICD-10-CM

## 2025-06-05 DIAGNOSIS — Z12.31 BREAST CANCER SCREENING BY MAMMOGRAM: ICD-10-CM

## 2025-06-05 DIAGNOSIS — R00.2 PALPITATIONS: ICD-10-CM

## 2025-06-05 DIAGNOSIS — R53.83 FATIGUE, UNSPECIFIED TYPE: ICD-10-CM

## 2025-06-05 DIAGNOSIS — I10 PRIMARY HYPERTENSION: ICD-10-CM

## 2025-06-05 DIAGNOSIS — Z00.00 MEDICARE ANNUAL WELLNESS VISIT, SUBSEQUENT: Primary | ICD-10-CM

## 2025-06-05 DIAGNOSIS — Z78.0 POSTMENOPAUSAL: ICD-10-CM

## 2025-06-05 DIAGNOSIS — R73.9 HYPERGLYCEMIA: ICD-10-CM

## 2025-06-05 DIAGNOSIS — E78.2 MIXED HYPERLIPIDEMIA: ICD-10-CM

## 2025-06-05 DIAGNOSIS — E78.5 HYPERLIPIDEMIA, UNSPECIFIED HYPERLIPIDEMIA TYPE: ICD-10-CM

## 2025-06-05 LAB
ATRIAL RATE: 64 BPM
P AXIS: 47 DEGREES
P OFFSET: 173 MS
P ONSET: 111 MS
PR INTERVAL: 216 MS
Q ONSET: 219 MS
QRS COUNT: 11 BEATS
QRS DURATION: 88 MS
QT INTERVAL: 420 MS
QTC CALCULATION(BAZETT): 433 MS
QTC FREDERICIA: 429 MS
R AXIS: -6 DEGREES
T AXIS: 15 DEGREES
T OFFSET: 429 MS
VENTRICULAR RATE: 64 BPM

## 2025-06-05 PROCEDURE — 1126F AMNT PAIN NOTED NONE PRSNT: CPT | Performed by: FAMILY MEDICINE

## 2025-06-05 PROCEDURE — 99214 OFFICE O/P EST MOD 30 MIN: CPT | Performed by: FAMILY MEDICINE

## 2025-06-05 PROCEDURE — 3075F SYST BP GE 130 - 139MM HG: CPT | Performed by: FAMILY MEDICINE

## 2025-06-05 PROCEDURE — 1160F RVW MEDS BY RX/DR IN RCRD: CPT | Performed by: FAMILY MEDICINE

## 2025-06-05 PROCEDURE — 1159F MED LIST DOCD IN RCRD: CPT | Performed by: FAMILY MEDICINE

## 2025-06-05 PROCEDURE — 1170F FXNL STATUS ASSESSED: CPT | Performed by: FAMILY MEDICINE

## 2025-06-05 PROCEDURE — G0439 PPPS, SUBSEQ VISIT: HCPCS | Performed by: FAMILY MEDICINE

## 2025-06-05 PROCEDURE — 1036F TOBACCO NON-USER: CPT | Performed by: FAMILY MEDICINE

## 2025-06-05 PROCEDURE — 3078F DIAST BP <80 MM HG: CPT | Performed by: FAMILY MEDICINE

## 2025-06-05 RX ORDER — EZETIMIBE 10 MG/1
10 TABLET ORAL DAILY
Qty: 90 TABLET | Refills: 0 | Status: SHIPPED | OUTPATIENT
Start: 2025-06-05

## 2025-06-05 RX ORDER — AMLODIPINE BESYLATE 5 MG/1
5 TABLET ORAL 2 TIMES DAILY
Qty: 180 TABLET | Refills: 1 | Status: SHIPPED | OUTPATIENT
Start: 2025-06-05

## 2025-06-05 RX ORDER — METOPROLOL TARTRATE 25 MG/1
25 TABLET, FILM COATED ORAL 2 TIMES DAILY
Qty: 180 TABLET | Refills: 0 | Status: SHIPPED | OUTPATIENT
Start: 2025-06-05

## 2025-06-05 RX ORDER — VALSARTAN 160 MG/1
160 TABLET ORAL 2 TIMES DAILY
Qty: 180 TABLET | Refills: 0 | Status: SHIPPED | OUTPATIENT
Start: 2025-06-05

## 2025-06-05 ASSESSMENT — ENCOUNTER SYMPTOMS
LOSS OF SENSATION IN FEET: 0
DEPRESSION: 0
OCCASIONAL FEELINGS OF UNSTEADINESS: 0

## 2025-06-05 ASSESSMENT — PAIN SCALES - GENERAL: PAINLEVEL_OUTOF10: 0-NO PAIN

## 2025-06-05 ASSESSMENT — ACTIVITIES OF DAILY LIVING (ADL)
DRESSING: INDEPENDENT
DOING_HOUSEWORK: INDEPENDENT
GROCERY_SHOPPING: INDEPENDENT
MANAGING_FINANCES: INDEPENDENT
TAKING_MEDICATION: INDEPENDENT
BATHING: INDEPENDENT

## 2025-06-05 ASSESSMENT — ANXIETY QUESTIONNAIRES
5. BEING SO RESTLESS THAT IT IS HARD TO SIT STILL: NOT AT ALL
3. WORRYING TOO MUCH ABOUT DIFFERENT THINGS: NOT AT ALL
7. FEELING AFRAID AS IF SOMETHING AWFUL MIGHT HAPPEN: NOT AT ALL
2. NOT BEING ABLE TO STOP OR CONTROL WORRYING: NOT AT ALL
IF YOU CHECKED OFF ANY PROBLEMS ON THIS QUESTIONNAIRE, HOW DIFFICULT HAVE THESE PROBLEMS MADE IT FOR YOU TO DO YOUR WORK, TAKE CARE OF THINGS AT HOME, OR GET ALONG WITH OTHER PEOPLE: NOT DIFFICULT AT ALL
6. BECOMING EASILY ANNOYED OR IRRITABLE: NOT AT ALL
1. FEELING NERVOUS, ANXIOUS, OR ON EDGE: NOT AT ALL
GAD7 TOTAL SCORE: 0
4. TROUBLE RELAXING: NOT AT ALL

## 2025-06-05 NOTE — PROGRESS NOTES
Subjective   Reason for Visit: Alexandra Love is an 79 y.o. female here for a Medicare Wellness visit.     Past Medical, Surgical, and Family History reviewed and updated in chart.    Reviewed all medications by prescribing practitioner or clinical pharmacist (such as prescriptions, OTCs, herbal therapies and supplements) and documented in the medical record.  Medicare Wellness Billing Compliance Satisfied    *This is a visual tool to show completion of required items on the day of the visit. Green checks will only appear on the date of visit.    Review all medications by prescribing practitioner or clinical pharmacist (such as prescriptions, OTCs, herbal therapies and supplements) documented in the medical record    Past Medical, Surgical, and Family History reviewed and updated in chart    Tobacco Use Reviewed    Alcohol Use Reviewed    Illicit Drug Use Reviewed    PHQ2/9    Falls in Last Year Reviewed    Home Safety Risk Factors Reviewed    Cognitive Impairment Reviewed    Patient Self Assessment and Health Status    Current Diet Reviewed    Exercise Frequency    ADL - Hearing Impairment    ADL - Bathing    ADL - Dressing    ADL - Walks in Home    IADL - Managing Finances    IADL - Grocery Shopping    IADL - Taking Medications    IADL - Doing Housework      HPI  Patient is in the office today for a Medicare Annual Wellness.  She is overdue for new lab tests, she is fasting today and will do it today. She is requesting med refills also    Her BP (135/76) was slightly elevated when checked today at the office. She reports that yesterday she was instructed to increase the dose of her amlodipine to 10 mg daily at her cardiology appt. She is using amlodipine 5 mg twice daily, metoprolol 25 mg twice daily, and valsartan 160 mg twice daily. She denies any side effects to the medication. She denies chest pain or SOB.     She has personal history of hyperlipidemia which is treated with atorvastatin  "80 mg daily, and ezetimibe 10 mg daily. She denies any side effects to the medication.     She is using latanoprost ophthalmic drops to treat her glaucoma. She denies any side effects to the medication.     Her last colonoscopy was in January 2024 with no further screening colonoscopy due to age.   Her last mammogram was in December 2023, she is ok to have the order for a new one.   Her last DEXA bone density scan was in October 2019, and is ok to have the order for a new one.     Patient Care Team:  Pratima Albert DO as PCP - General  Pratima Albert DO     Review of Systems    Objective   Vitals:  /76 (BP Location: Left arm, Patient Position: Sitting, BP Cuff Size: Adult)   Pulse 62   Temp 36.7 °C (98 °F) (Temporal)   Ht 1.562 m (5' 1.5\")   Wt 80.3 kg (177 lb)   SpO2 94%   BMI 32.90 kg/m²       Physical Exam  Neck:      Vascular: No carotid bruit.   Cardiovascular:      Rate and Rhythm: Normal rate and regular rhythm.      Pulses: Normal pulses.      Heart sounds: Normal heart sounds.   Pulmonary:      Effort: Pulmonary effort is normal.      Breath sounds: Normal breath sounds.   Abdominal:      General: Bowel sounds are normal.      Palpations: Abdomen is soft. There is no mass.      Tenderness: There is no abdominal tenderness.   Musculoskeletal:         General: Normal range of motion.      Cervical back: Normal range of motion.      Right lower leg: No edema.      Left lower leg: No edema.   Lymphadenopathy:      Cervical: No cervical adenopathy.   Skin:     General: Skin is warm and dry.      Findings: No rash.   Neurological:      Mental Status: She is alert and oriented to person, place, and time.   Psychiatric:         Mood and Affect: Mood normal.         Thought Content: Thought content normal.         Judgment: Judgment normal.         Assessment & Plan  Medicare annual wellness visit, subsequent         Essential hypertension  Recommended continue with amlodipine 10 mg " daily, and valsartan 160 mg twice daily.   Orders:    CBC and Auto Differential; Future    Hyperlipidemia, unspecified hyperlipidemia type  Recommended continue with Zetia, and atorvastatin as prescribed.   Orders:    Comprehensive Metabolic Panel; Future    Lipid Panel; Future    Mixed hyperlipidemia  Recommended continue with Zetia, and atorvastatin as prescribed.   Orders:    ezetimibe (Zetia) 10 mg tablet; Take 1 tablet (10 mg) by mouth once daily.    Primary hypertension  Recommended continue with amlodipine 10 mg daily, and valsartan 160 mg twice daily.   Orders:    CBC and Auto Differential; Future    amLODIPine (Norvasc) 5 mg tablet; Take 1 tablet (5 mg) by mouth 2 times a day.    valsartan (Diovan) 160 mg tablet; Take 1 tablet (160 mg) by mouth 2 times a day.    Hyperglycemia  Will monitor with new lab tests.  Orders:    Hemoglobin A1C; Future    Fatigue, unspecified type  Will monitor with new lab tests.  Orders:    TSH with reflex to Free T4 if abnormal; Future    Palpitations  Recommended continue with metoprolol 25 mg twice daily.   Orders:    metoprolol tartrate (Lopressor) 25 mg tablet; Take 1 tablet (25 mg) by mouth 2 times a day.    Breast cancer screening by mammogram  Recommended schedule screening mammogram.   Orders:    BI mammo bilateral screening tomosynthesis; Future    Postmenopausal  Recommended schedule DEXA bone density scan.  Orders:    XR DEXA bone density; Future    Recommended schedule screening mammogram, and DEXA bone density scan.     Recommended lab tests today because she is fasting. Will call w results  Medications refilled today. Instructed take medications as prescribed.   Reviewed vaccines, will get at pharmacy tetanus and shingrix  She will see DR Lynch /derm for changing skin lesion R breast, states larger and now crusty  She has lesion R upper gum area, was very painful, using hydrogen peroxide and less pain but not resolving.  Seeing dentist, may need referral to oral  surgeon or ent, she states dentist did not advise her yet whet to nancy w it, he did take xrays of the area.  Follow-up in 6 months, call us if needed sooner.        Scribe Attestation  By signing my name below, I, Chapni Trevino   attest that this documentation has been prepared under the direction and in the presence of Pratima Albert DO.

## 2025-06-05 NOTE — ASSESSMENT & PLAN NOTE
Recommended continue with Zetia, and atorvastatin as prescribed.   Orders:    ezetimibe (Zetia) 10 mg tablet; Take 1 tablet (10 mg) by mouth once daily.

## 2025-06-05 NOTE — ASSESSMENT & PLAN NOTE
Recommended continue with metoprolol 25 mg twice daily.   Orders:    metoprolol tartrate (Lopressor) 25 mg tablet; Take 1 tablet (25 mg) by mouth 2 times a day.

## 2025-06-05 NOTE — ASSESSMENT & PLAN NOTE
Recommended continue with Zetia, and atorvastatin as prescribed.   Orders:    Comprehensive Metabolic Panel; Future    Lipid Panel; Future

## 2025-06-05 NOTE — ASSESSMENT & PLAN NOTE
Recommended continue with amlodipine 10 mg daily, and valsartan 160 mg twice daily.   Orders:    CBC and Auto Differential; Future    amLODIPine (Norvasc) 5 mg tablet; Take 1 tablet (5 mg) by mouth 2 times a day.    valsartan (Diovan) 160 mg tablet; Take 1 tablet (160 mg) by mouth 2 times a day.

## 2025-06-20 ENCOUNTER — HOSPITAL ENCOUNTER (OUTPATIENT)
Dept: RADIOLOGY | Facility: CLINIC | Age: 80
Discharge: HOME | End: 2025-06-20
Payer: MEDICARE

## 2025-06-20 DIAGNOSIS — Z12.31 BREAST CANCER SCREENING BY MAMMOGRAM: ICD-10-CM

## 2025-06-20 DIAGNOSIS — Z78.0 POSTMENOPAUSAL: ICD-10-CM

## 2025-06-20 PROCEDURE — 77080 DXA BONE DENSITY AXIAL: CPT

## 2025-06-20 PROCEDURE — 77063 BREAST TOMOSYNTHESIS BI: CPT

## 2025-06-21 LAB
ALBUMIN SERPL-MCNC: 4.4 G/DL (ref 3.6–5.1)
ALP SERPL-CCNC: 53 U/L (ref 37–153)
ALT SERPL-CCNC: 15 U/L (ref 6–29)
ANION GAP SERPL CALCULATED.4IONS-SCNC: 13 MMOL/L (CALC) (ref 7–17)
AST SERPL-CCNC: 19 U/L (ref 10–35)
BASOPHILS # BLD AUTO: 62 CELLS/UL (ref 0–200)
BASOPHILS NFR BLD AUTO: 0.8 %
BILIRUB SERPL-MCNC: 1.3 MG/DL (ref 0.2–1.2)
BUN SERPL-MCNC: 10 MG/DL (ref 7–25)
CALCIUM SERPL-MCNC: 9.9 MG/DL (ref 8.6–10.4)
CHLORIDE SERPL-SCNC: 104 MMOL/L (ref 98–110)
CHOLEST SERPL-MCNC: 164 MG/DL
CHOLEST/HDLC SERPL: 2.7 (CALC)
CO2 SERPL-SCNC: 24 MMOL/L (ref 20–32)
CREAT SERPL-MCNC: 0.61 MG/DL (ref 0.6–1)
EGFRCR SERPLBLD CKD-EPI 2021: 92 ML/MIN/1.73M2
EOSINOPHIL # BLD AUTO: 439 CELLS/UL (ref 15–500)
EOSINOPHIL NFR BLD AUTO: 5.7 %
ERYTHROCYTE [DISTWIDTH] IN BLOOD BY AUTOMATED COUNT: 14.6 % (ref 11–15)
EST. AVERAGE GLUCOSE BLD GHB EST-MCNC: 123 MG/DL
EST. AVERAGE GLUCOSE BLD GHB EST-SCNC: 6.8 MMOL/L
GLUCOSE SERPL-MCNC: 96 MG/DL (ref 65–99)
HBA1C MFR BLD: 5.9 %
HCT VFR BLD AUTO: 44.9 % (ref 35–45)
HDLC SERPL-MCNC: 61 MG/DL
HGB BLD-MCNC: 13.9 G/DL (ref 11.7–15.5)
LDLC SERPL CALC-MCNC: 86 MG/DL (CALC)
LYMPHOCYTES # BLD AUTO: 2610 CELLS/UL (ref 850–3900)
LYMPHOCYTES NFR BLD AUTO: 33.9 %
MCH RBC QN AUTO: 25.8 PG (ref 27–33)
MCHC RBC AUTO-ENTMCNC: 31 G/DL (ref 32–36)
MCV RBC AUTO: 83.5 FL (ref 80–100)
MONOCYTES # BLD AUTO: 608 CELLS/UL (ref 200–950)
MONOCYTES NFR BLD AUTO: 7.9 %
NEUTROPHILS # BLD AUTO: 3981 CELLS/UL (ref 1500–7800)
NEUTROPHILS NFR BLD AUTO: 51.7 %
NONHDLC SERPL-MCNC: 103 MG/DL (CALC)
PLATELET # BLD AUTO: 346 THOUSAND/UL (ref 140–400)
PMV BLD REES-ECKER: 9.5 FL (ref 7.5–12.5)
POTASSIUM SERPL-SCNC: 3.6 MMOL/L (ref 3.5–5.3)
PROT SERPL-MCNC: 7.3 G/DL (ref 6.1–8.1)
RBC # BLD AUTO: 5.38 MILLION/UL (ref 3.8–5.1)
SODIUM SERPL-SCNC: 141 MMOL/L (ref 135–146)
TRIGL SERPL-MCNC: 81 MG/DL
TSH SERPL-ACNC: 1.54 MIU/L (ref 0.4–4.5)
WBC # BLD AUTO: 7.7 THOUSAND/UL (ref 3.8–10.8)

## 2025-07-03 ENCOUNTER — OFFICE VISIT (OUTPATIENT)
Dept: PRIMARY CARE | Facility: CLINIC | Age: 80
End: 2025-07-03
Payer: MEDICARE

## 2025-07-03 VITALS
HEART RATE: 63 BPM | BODY MASS INDEX: 32.72 KG/M2 | OXYGEN SATURATION: 97 % | WEIGHT: 176 LBS | SYSTOLIC BLOOD PRESSURE: 136 MMHG | DIASTOLIC BLOOD PRESSURE: 68 MMHG | RESPIRATION RATE: 16 BRPM

## 2025-07-03 DIAGNOSIS — M79.89 SWELLING OF BOTH LOWER EXTREMITIES: ICD-10-CM

## 2025-07-03 DIAGNOSIS — R10.9 RIGHT FLANK PAIN: Primary | ICD-10-CM

## 2025-07-03 DIAGNOSIS — G47.00 INSOMNIA, UNSPECIFIED TYPE: ICD-10-CM

## 2025-07-03 DIAGNOSIS — R31.29 MICROHEMATURIA: ICD-10-CM

## 2025-07-03 DIAGNOSIS — T88.7XXA MEDICATION SIDE EFFECT: ICD-10-CM

## 2025-07-03 DIAGNOSIS — R05.9 COUGH, UNSPECIFIED TYPE: ICD-10-CM

## 2025-07-03 LAB
POC APPEARANCE, URINE: CLEAR
POC BILIRUBIN, URINE: NEGATIVE
POC BLOOD, URINE: ABNORMAL
POC COLOR, URINE: YELLOW
POC GLUCOSE, URINE: NEGATIVE MG/DL
POC KETONES, URINE: NEGATIVE MG/DL
POC LEUKOCYTES, URINE: NEGATIVE
POC NITRITE,URINE: NEGATIVE
POC PH, URINE: 7 PH
POC PROTEIN, URINE: NEGATIVE MG/DL
POC SPECIFIC GRAVITY, URINE: 1.02
POC UROBILINOGEN, URINE: 0.2 EU/DL

## 2025-07-03 PROCEDURE — 81003 URINALYSIS AUTO W/O SCOPE: CPT | Performed by: FAMILY MEDICINE

## 2025-07-03 PROCEDURE — 1159F MED LIST DOCD IN RCRD: CPT | Performed by: FAMILY MEDICINE

## 2025-07-03 PROCEDURE — 1160F RVW MEDS BY RX/DR IN RCRD: CPT | Performed by: FAMILY MEDICINE

## 2025-07-03 PROCEDURE — 99214 OFFICE O/P EST MOD 30 MIN: CPT | Performed by: FAMILY MEDICINE

## 2025-07-03 PROCEDURE — 3075F SYST BP GE 130 - 139MM HG: CPT | Performed by: FAMILY MEDICINE

## 2025-07-03 PROCEDURE — 3078F DIAST BP <80 MM HG: CPT | Performed by: FAMILY MEDICINE

## 2025-07-03 NOTE — PATIENT INSTRUCTIONS
Lower extremity swelling likely due to Amlodipine (started after dose was increased).  Patient does not want to take a diuretic because Spironolactone caused palpitations in the past.  Patient advised to contact cardiology to notify them of the lower extremity swelling as advised at prior office visit.  In the meantime, continue lower extremity elevation and consider compression stockings.    Urine microscopy sent for further evaluation of microhematuria.  Consider otc analgesics for flack pain as we await urine microscopy results.    Try otc Melatonin for sleep.  Try otc cough medication as needed (of note, patient is on Valsartan).    Follow up with PCP if these symptoms worsen or fail to improve as anticipated.

## 2025-07-03 NOTE — PROGRESS NOTES
Subjective   Patient ID: Alexandra Love is a 79 y.o. female who presents for Foot Swelling (Both /) and right side pain .    HPI   PCP: Dr. Henley    Reports BLE swelling.  Saw cardiology 6/4/25.  /82.  Norvasc increased.  Patient did not want any diuretics because Spironolactone caused palpitations in the past.  States cardiology told her to contact them if she had any BLE swelling after increasing Norvasc.     BLE swelling started 1 wk after increasing Norvasc.   States she called cardiology as directed, left a message, and is waiting for a call back.  No chest pain, SOB, PND.  No recent travel, no current Dx of cancer.  Elevating the legs helps temporarily.    Right flank pain x 2 wks.  No trauma or increased activity prior to onset.  Described as ache.  Intermittent (not daily).  Worse w/nothing.  Improved w/inactivity.  Max 7/10.  Currently 7/10.  No overlying skin changes, no dermatomal rash, no dysuria, no hematuria.  Has not tried any otc analgesics.      Review of Systems  Occ cough over the last week.  Has not tried otc cough medication.  Trouble staying asleep over the last year.  Has not tried otc sleep aids.    Objective   /68   Pulse 63   Resp 16   Wt 79.8 kg (176 lb)   SpO2 97%   BMI 32.72 kg/m²     Physical Exam  Constitutional:       General: She is not in acute distress.     Appearance: She is obese.   Cardiovascular:      Rate and Rhythm: Normal rate and regular rhythm.      Heart sounds: Normal heart sounds. No murmur heard.     No friction rub. No gallop.   Pulmonary:      Effort: Pulmonary effort is normal.      Breath sounds: Normal breath sounds. No wheezing or rhonchi.   Abdominal:      Comments: Right flank tenderness   Musculoskeletal:      Comments: BLE swelling.  Jez's neg, No palpable cords.   Skin:     Comments: No vesicular or dermatomal rash   Neurological:      Mental Status: She is oriented to person, place, and time.   Psychiatric:         Mood and Affect: Mood  normal.         Behavior: Behavior normal.       POC Color, Urine  Straw, Yellow, Light-Yellow Yellow   POC Appearance, Urine  Clear Clear   POC Glucose, Urine  NEGATIVE mg/dl NEGATIVE   POC Bilirubin, Urine  NEGATIVE NEGATIVE   POC Ketones, Urine  NEGATIVE mg/dl NEGATIVE   POC Specific Gravity, Urine  1.005 - 1.035 1.020   POC Blood, Urine  NEGATIVE TRACE-Intact Abnormal    POC PH, Urine  No Reference Range Established PH 7.0   POC Protein, Urine  NEGATIVE mg/dl NEGATIVE   POC Urobilinogen, Urine  0.2, 1.0 EU/DL 0.2   Poc Nitrite, Urine  NEGATIVE NEGATIVE   POC Leukocytes, Urine  NEGATIVE NEGATIVE     Assessment/Plan   Diagnoses and all orders for this visit:  Right flank pain  -     POCT UA Automated manually resulted  Microhematuria  -     Microscopic Only, Urine  Swelling of both lower extremities  Medication side effect  Cough, unspecified type  Insomnia, unspecified type    Lower extremity swelling likely due to Amlodipine (started after dose was increased).  Patient does not want to take a diuretic because Spironolactone caused palpitations in the past.  Patient advised to contact cardiology to notify them of the lower extremity swelling as advised at prior office visit.  In the meantime, continue lower extremity elevation and consider compression stockings.    Urine microscopy sent for further evaluation of microhematuria.  Consider otc analgesics for flack pain as we await urine microscopy results.    Try otc Melatonin for sleep.  Try otc cough medication as needed (of note, patient is on Valsartan).    Follow up with PCP if these symptoms worsen or fail to improve as anticipated.

## 2025-07-04 LAB
BACTERIA #/AREA URNS HPF: NORMAL /HPF
HYALINE CASTS #/AREA URNS LPF: NORMAL /LPF
RBC #/AREA URNS HPF: NORMAL /HPF
SERVICE CMNT-IMP: NORMAL
SQUAMOUS #/AREA URNS HPF: NORMAL /HPF
WBC #/AREA URNS HPF: NORMAL /HPF

## 2025-07-09 ENCOUNTER — TELEPHONE (OUTPATIENT)
Dept: CARDIOLOGY | Facility: CLINIC | Age: 80
End: 2025-07-09
Payer: MEDICARE

## 2025-07-09 NOTE — TELEPHONE ENCOUNTER
I spoke with patient who will resume 5 mg daily of amlodipine.  She will check BP 2-3 times weekly and followup as scheduled in 2 weeks.

## 2025-07-16 ENCOUNTER — OFFICE VISIT (OUTPATIENT)
Dept: CARDIOLOGY | Facility: HOSPITAL | Age: 80
End: 2025-07-16
Payer: MEDICARE

## 2025-07-16 VITALS
DIASTOLIC BLOOD PRESSURE: 84 MMHG | OXYGEN SATURATION: 96 % | HEART RATE: 59 BPM | WEIGHT: 178 LBS | BODY MASS INDEX: 32.76 KG/M2 | SYSTOLIC BLOOD PRESSURE: 143 MMHG | HEIGHT: 62 IN

## 2025-07-16 DIAGNOSIS — E78.2 MIXED HYPERLIPIDEMIA: Primary | ICD-10-CM

## 2025-07-16 DIAGNOSIS — I10 PRIMARY HYPERTENSION: ICD-10-CM

## 2025-07-16 DIAGNOSIS — R00.2 PALPITATIONS: ICD-10-CM

## 2025-07-16 PROCEDURE — 1126F AMNT PAIN NOTED NONE PRSNT: CPT | Performed by: NURSE PRACTITIONER

## 2025-07-16 PROCEDURE — 99213 OFFICE O/P EST LOW 20 MIN: CPT

## 2025-07-16 PROCEDURE — 3077F SYST BP >= 140 MM HG: CPT | Performed by: NURSE PRACTITIONER

## 2025-07-16 PROCEDURE — 1036F TOBACCO NON-USER: CPT | Performed by: NURSE PRACTITIONER

## 2025-07-16 PROCEDURE — 99214 OFFICE O/P EST MOD 30 MIN: CPT | Performed by: NURSE PRACTITIONER

## 2025-07-16 PROCEDURE — 1160F RVW MEDS BY RX/DR IN RCRD: CPT | Performed by: NURSE PRACTITIONER

## 2025-07-16 PROCEDURE — 3079F DIAST BP 80-89 MM HG: CPT | Performed by: NURSE PRACTITIONER

## 2025-07-16 PROCEDURE — 1159F MED LIST DOCD IN RCRD: CPT | Performed by: NURSE PRACTITIONER

## 2025-07-16 PROCEDURE — 99212 OFFICE O/P EST SF 10 MIN: CPT

## 2025-07-16 RX ORDER — ATORVASTATIN CALCIUM 80 MG/1
80 TABLET, FILM COATED ORAL NIGHTLY
Qty: 90 TABLET | Refills: 3 | Status: SHIPPED | OUTPATIENT
Start: 2025-07-16 | End: 2026-07-16

## 2025-07-16 ASSESSMENT — ENCOUNTER SYMPTOMS
CONSTITUTIONAL NEGATIVE: 1
CARDIOVASCULAR NEGATIVE: 1
RESPIRATORY NEGATIVE: 1
OCCASIONAL FEELINGS OF UNSTEADINESS: 0
DEPRESSION: 0
LOSS OF SENSATION IN FEET: 0

## 2025-07-16 ASSESSMENT — COLUMBIA-SUICIDE SEVERITY RATING SCALE - C-SSRS
6. HAVE YOU EVER DONE ANYTHING, STARTED TO DO ANYTHING, OR PREPARED TO DO ANYTHING TO END YOUR LIFE?: NO
1. IN THE PAST MONTH, HAVE YOU WISHED YOU WERE DEAD OR WISHED YOU COULD GO TO SLEEP AND NOT WAKE UP?: NO
2. HAVE YOU ACTUALLY HAD ANY THOUGHTS OF KILLING YOURSELF?: NO

## 2025-07-16 ASSESSMENT — PAIN SCALES - GENERAL: PAINLEVEL_OUTOF10: 0-NO PAIN

## 2025-07-16 NOTE — PROGRESS NOTES
Subjective   Alexandra Love is a 79 y.o. female.    Chief Complaint:  Follow-up    HPI  Mrs. Love is seen for a 6-week follow-up for hypertension.  She is seen in collaboration with Dr. Cantu. We had increased her amlodipine however she developed significant ankle and foot edema.  She had noticed water blisters on her feet which have since resolved.  She does have some remaining dry skin.  She has checked her home blood pressure which has been normotensive.  She tells me that she is taking some time for herself.  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  She notices that this tends to have improved her blood pressure.  She is using compression stockings and elevating her feet when possible.  She has the help of family members, particularly her son who completes her today.  She has no cardiovascular concerns and does not need prescription refills.    Review of Systems   Constitutional: Negative.   Cardiovascular: Negative.    Respiratory: Negative.     All other systems reviewed and are negative.      Objective   Vitals reviewed.   Constitutional:       Appearance: Healthy appearance. Not in distress.   Neck:      Vascular: No JVR. JVD normal.   Pulmonary:      Effort: Pulmonary effort is normal.      Breath sounds: Normal breath sounds. No wheezing. No rhonchi. No rales.   Chest:      Chest wall: Not tender to palpatation.   Cardiovascular:      Normal rate. Regular rhythm. Normal S1. Normal S2.       Murmurs: There is no murmur.      No gallop.  No click. No rub.   Pulses:     Intact distal pulses.   Edema:     Ankle: bilateral trace edema of the ankle.  Abdominal:      General: Bowel sounds are normal.      Palpations: Abdomen is soft.      Tenderness: There is no abdominal tenderness.   Musculoskeletal: Normal range of motion.         General: No tenderness. Skin:     General: Skin is warm and dry.   Neurological:      General: No focal deficit present.      Mental Status: Alert and oriented  to person, place and time.       Lab Review:   Lab Results   Component Value Date     06/20/2025    K 3.6 06/20/2025     06/20/2025    CO2 24 06/20/2025    BUN 10 06/20/2025    CREATININE 0.61 06/20/2025    GLUCOSE 96 06/20/2025    CALCIUM 9.9 06/20/2025     Lab Results   Component Value Date    WBC 7.7 06/20/2025    HGB 13.9 06/20/2025    HCT 44.9 06/20/2025    MCV 83.5 06/20/2025     06/20/2025     Lab Results   Component Value Date    CHOL 164 06/20/2025    TRIG 81 06/20/2025    HDL 61 06/20/2025       Assessment/Plan   Mrs. Love is a pleasant 79 year old  female with a past medical history significant for hypertension, hyperlipidemia and palpitations.  She had a TIA 4/2024.  Echocardiogram 4/3/2024 showed showed an EF of 70 to 75%.   NM stress test 10/2022 showed no evidence of ischemia with normal LVEF.  She presents with normotensive home blood pressure readings on a 5 mg dose of amlodipine.  This was reduced due to significant ankle edema.  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  We discussed the importance of continuing to do so.  She will use compression stockings and foot elevation as well as limiting her sodium.  I will hold off on diuretics as she has palpitations with both HCTZ and spironolactone.  We could use 10-20 mg PRN furosemide 1-2 times weekly if necessary.  She will call should she feel nonpharmacologic therapies are not working.  She will return in another 4 months and knows to call for any interim questions.

## 2025-10-29 ENCOUNTER — APPOINTMENT (OUTPATIENT)
Dept: CARDIOLOGY | Facility: HOSPITAL | Age: 80
End: 2025-10-29
Payer: MEDICARE

## 2025-12-11 ENCOUNTER — APPOINTMENT (OUTPATIENT)
Dept: PRIMARY CARE | Facility: CLINIC | Age: 80
End: 2025-12-11
Payer: MEDICARE